# Patient Record
Sex: MALE | Race: WHITE | Employment: FULL TIME | URBAN - METROPOLITAN AREA
[De-identification: names, ages, dates, MRNs, and addresses within clinical notes are randomized per-mention and may not be internally consistent; named-entity substitution may affect disease eponyms.]

---

## 2019-01-23 ENCOUNTER — TELEPHONE (OUTPATIENT)
Dept: ORTHOPEDIC SURGERY | Age: 54
End: 2019-01-23

## 2019-05-24 ENCOUNTER — OFFICE VISIT (OUTPATIENT)
Dept: FAMILY MEDICINE CLINIC | Age: 54
End: 2019-05-24
Payer: COMMERCIAL

## 2019-05-24 VITALS
SYSTOLIC BLOOD PRESSURE: 118 MMHG | DIASTOLIC BLOOD PRESSURE: 74 MMHG | WEIGHT: 150.2 LBS | HEIGHT: 69 IN | BODY MASS INDEX: 22.25 KG/M2 | TEMPERATURE: 98.5 F | HEART RATE: 48 BPM

## 2019-05-24 DIAGNOSIS — R11.2 NAUSEA AND VOMITING, INTRACTABILITY OF VOMITING NOT SPECIFIED, UNSPECIFIED VOMITING TYPE: ICD-10-CM

## 2019-05-24 DIAGNOSIS — K59.1 FUNCTIONAL DIARRHEA: Primary | ICD-10-CM

## 2019-05-24 PROBLEM — R11.10 VOMITING: Status: ACTIVE | Noted: 2019-05-24

## 2019-05-24 PROCEDURE — 99213 OFFICE O/P EST LOW 20 MIN: CPT | Performed by: FAMILY MEDICINE

## 2019-05-24 RX ORDER — DOCUSATE SODIUM 100 MG/1
100 CAPSULE, LIQUID FILLED ORAL 2 TIMES DAILY
COMMUNITY
End: 2021-11-22 | Stop reason: ALTCHOICE

## 2019-05-24 RX ORDER — ONDANSETRON 4 MG/1
4 TABLET, FILM COATED ORAL 3 TIMES DAILY PRN
Qty: 10 TABLET | Refills: 2 | Status: SHIPPED | OUTPATIENT
Start: 2019-05-24 | End: 2019-07-01

## 2019-05-24 RX ORDER — PROMETHAZINE HYDROCHLORIDE 25 MG/1
25 SUPPOSITORY RECTAL
COMMUNITY
End: 2020-05-06

## 2019-05-24 ASSESSMENT — ENCOUNTER SYMPTOMS
SHORTNESS OF BREATH: 0
NAUSEA: 1
ABDOMINAL PAIN: 0
RHINORRHEA: 0
COUGH: 0
SORE THROAT: 0
CONSTIPATION: 0
SINUS PRESSURE: 0
VOMITING: 1
DIARRHEA: 1
CHEST TIGHTNESS: 0

## 2019-05-24 NOTE — PROGRESS NOTES
PATIENT HERE FOR SOV. REPORTS THAT HE STARTED HAVING VOMITTING AND DIARRHEA YESTERDAY. HAS STOPPED VOMITTING, BUT STILL HAS DIARRHEA. WAS ABLE TO KEEP DOWN SPRITE LAST NIGHT. NEEDS A RETURN TO WORK FOR TOMORROW.

## 2019-05-24 NOTE — PROGRESS NOTES
5/24/2019    Tyler Pappas    Chief Complaint   Patient presents with    Emesis    Diarrhea    Other     RETURN TO WORK NEEDED       HPI  History was obtained from patient. Dayday Varela is a 47 y.o. male who presents today needing a return to work note. Complains of having nausea, vomiting and diarrhea yesterday. The nausea & vomiting  have subsided, still with some diarrhea today. Says he feels 75% better. He hasn't taken any medications for the symptoms. He has no other complaints, needs a return to work note. Patient doubt food poisoning. He notes that most family members have this lasts for a day or 2. He is already improving but missed work yesterday. Patient has a history of cyclic and intractable vomiting so we need to be very careful when he gets GI symptoms. Patient notes that Phenergan decreases his mental state significantly. He would like to try Zofran. REVIEW OF SYMPTOMS    Review of Systems   Constitutional: Negative for chills and fever. HENT: Negative for rhinorrhea, sinus pressure and sore throat. Respiratory: Negative for cough, chest tightness and shortness of breath. Gastrointestinal: Positive for diarrhea, nausea and vomiting. Negative for abdominal pain and constipation. Genitourinary: Negative for frequency. Musculoskeletal: Negative for myalgias. PAST MEDICAL HISTORY  No past medical history on file.     FAMILY HISTORY  Family History   Problem Relation Age of Onset    Parkinsonism Mother     Diabetes Father     Hypertension Father     Colon Cancer Maternal Grandfather     Colon Cancer Paternal Grandfather     Diabetes Other     Hypertension Other     Cancer Other         bladder       SOCIAL HISTORY  Social History     Socioeconomic History    Marital status:      Spouse name: None    Number of children: None    Years of education: None    Highest education level: None   Occupational History    None   Social Needs    Financial resource strain: None    Food insecurity:     Worry: None     Inability: None    Transportation needs:     Medical: None     Non-medical: None   Tobacco Use    Smoking status: Former Smoker     Packs/day: 1.00     Years: 10.00     Pack years: 10.00     Last attempt to quit: 5/24/2004     Years since quitting: 15.0    Smokeless tobacco: Never Used   Substance and Sexual Activity    Alcohol use: Yes     Alcohol/week: 0.6 oz     Types: 1 Cans of beer per week    Drug use: No    Sexual activity: Yes     Partners: Female   Lifestyle    Physical activity:     Days per week: None     Minutes per session: None    Stress: None   Relationships    Social connections:     Talks on phone: None     Gets together: None     Attends Holiness service: None     Active member of club or organization: None     Attends meetings of clubs or organizations: None     Relationship status: None    Intimate partner violence:     Fear of current or ex partner: None     Emotionally abused: None     Physically abused: None     Forced sexual activity: None   Other Topics Concern    None   Social History Narrative    None        SURGICAL HISTORY  Past Surgical History:   Procedure Laterality Date    COLONOSCOPY  07/10/2017    diverticulosis, 2mm polyp, hem- Dr Deluca Bulgarian recomm 5 yr repeat    LITHOTRIPSY      TONSILLECTOMY AND ADENOIDECTOMY         CURRENT MEDICATIONS  Current Outpatient Medications   Medication Sig Dispense Refill    docusate sodium (COLACE) 100 MG capsule Take 100 mg by mouth 2 times daily      promethazine (PHENERGAN) 25 MG suppository Place 25 mg rectally every 48 hours as needed      citalopram (CELEXA) 20 MG tablet Take 20 mg by mouth daily. No current facility-administered medications for this visit.         ALLERGIES  Allergies   Allergen Reactions    Vicodin [Hydrocodone-Acetaminophen]        PHYSICAL EXAM    /74 (Site: Left Upper Arm)   Pulse (!) 48   Temp 98.5 °F (36.9 °C)   Ht 5' 9\" (1.753 m)   Wt 150 lb 3.2 oz (68.1 kg)   BMI 22.18 kg/m²     Physical Exam   Constitutional: He is oriented to person, place, and time. He appears well-developed. HENT:   Head: Normocephalic. Right Ear: External ear normal.   Left Ear: External ear normal.   Eyes: Conjunctivae and EOM are normal.   Neck: Neck supple. Cardiovascular: Normal rate, regular rhythm and normal heart sounds. Exam reveals no gallop and no friction rub. No murmur heard. Heart sounds regular & not fast.   Pulmonary/Chest: Effort normal and breath sounds normal. No stridor. No respiratory distress. He has no wheezes. He has no rales. He exhibits no tenderness. Lungs clear   Abdominal: Soft. Bowel sounds are normal. He exhibits no distension and no mass. There is no tenderness. There is no rebound and no guarding. No hernia. Musculoskeletal: Normal range of motion. Neurological: He is alert and oriented to person, place, and time. Skin: Skin is warm and dry. Nursing note and vitals reviewed. ASSESSMENT & PLAN    1. Functional diarrhea  Educated. Patient on probiotics. Cautioned him to not use Imodium as he's had problems with constipation. 2. Nausea and vomiting, intractability of vomiting not specified, unspecified vomiting type  - ondansetron (ZOFRAN) 4 MG tablet; Take 1 tablet by mouth 3 times daily as needed for Nausea or Vomiting  Dispense: 10 tablet; Refill: 2  Work note written to be off work yesterday and today return tomorrow without restriction      No follow-ups on file.           Electronically signed by Talat Zapata on 5/24/2019      Scribe Authentication Statement  I, Mimi Matthews, scribed portions of this documentation for and in the presence of Corey Jean MD on 5/24/19 at 9:32 AM.

## 2019-05-24 NOTE — LETTER
[address] [facility] [address] [id number]  Phone: [phone]  Fax: [phone]    Austin Courtney MD        May 24, 2019     Patient: Rosalie Stroud   YOB: 1965   Date of Visit: 5/24/2019       To Whom It May Concern: It is my medical opinion that Anthnoy Fletcher is to remain off work from 5/23/2019-5/24/2019 and    Return on 5/25/2019. If you have any questions or concerns, please don't hesitate to call.     Sincerely,        Austin Courtney MD

## 2019-06-01 ENCOUNTER — OFFICE VISIT (OUTPATIENT)
Dept: FAMILY MEDICINE CLINIC | Age: 54
End: 2019-06-01
Payer: COMMERCIAL

## 2019-06-01 ENCOUNTER — HOSPITAL ENCOUNTER (OUTPATIENT)
Age: 54
Discharge: HOME | End: 2019-06-01
Payer: COMMERCIAL

## 2019-06-01 VITALS
DIASTOLIC BLOOD PRESSURE: 60 MMHG | TEMPERATURE: 98.2 F | BODY MASS INDEX: 20.88 KG/M2 | OXYGEN SATURATION: 91 % | WEIGHT: 141 LBS | SYSTOLIC BLOOD PRESSURE: 100 MMHG | HEIGHT: 69 IN | HEART RATE: 72 BPM

## 2019-06-01 DIAGNOSIS — R11.2 NAUSEA AND VOMITING, INTRACTABILITY OF VOMITING NOT SPECIFIED, UNSPECIFIED VOMITING TYPE: Primary | ICD-10-CM

## 2019-06-01 LAB
ALBUMIN SERPL-MCNC: 4.7 GM/DL (ref 3.4–5)
ALP BLD-CCNC: 90 IU/L (ref 40–128)
ALT SERPL-CCNC: 17 U/L (ref 10–40)
ANION GAP SERPL CALCULATED.3IONS-SCNC: 19 MMOL/L (ref 4–16)
AST SERPL-CCNC: 14 IU/L (ref 15–37)
BASOPHILS ABSOLUTE: 0.1 K/CU MM
BASOPHILS RELATIVE PERCENT: 0.9 % (ref 0–1)
BILIRUB SERPL-MCNC: 0.9 MG/DL (ref 0–1)
BUN BLDV-MCNC: 27 MG/DL (ref 6–23)
CALCIUM SERPL-MCNC: 9.8 MG/DL (ref 8.3–10.6)
CHLORIDE BLD-SCNC: 93 MMOL/L (ref 99–110)
CO2: 22 MMOL/L (ref 21–32)
CREAT SERPL-MCNC: 1.1 MG/DL (ref 0.9–1.3)
DIFFERENTIAL TYPE: ABNORMAL
EOSINOPHILS ABSOLUTE: 0.2 K/CU MM
EOSINOPHILS RELATIVE PERCENT: 2.7 % (ref 0–3)
GFR AFRICAN AMERICAN: >60 ML/MIN/1.73M2
GFR NON-AFRICAN AMERICAN: >60 ML/MIN/1.73M2
GLUCOSE BLD-MCNC: 98 MG/DL (ref 70–99)
HCT VFR BLD CALC: 50.4 % (ref 42–52)
HEMOGLOBIN: 16.6 GM/DL (ref 13.5–18)
IMMATURE NEUTROPHIL %: 0.9 % (ref 0–0.43)
LYMPHOCYTES ABSOLUTE: 3 K/CU MM
LYMPHOCYTES RELATIVE PERCENT: 36.7 % (ref 24–44)
MCH RBC QN AUTO: 31.2 PG (ref 27–31)
MCHC RBC AUTO-ENTMCNC: 32.9 % (ref 32–36)
MCV RBC AUTO: 94.7 FL (ref 78–100)
MONOCYTES ABSOLUTE: 0.9 K/CU MM
MONOCYTES RELATIVE PERCENT: 11 % (ref 0–4)
NUCLEATED RBC %: 0 %
PDW BLD-RTO: 12.1 % (ref 11.7–14.9)
PLATELET # BLD: 538 K/CU MM (ref 140–440)
PMV BLD AUTO: 9.8 FL (ref 7.5–11.1)
POTASSIUM SERPL-SCNC: 4.7 MMOL/L (ref 3.5–5.1)
RBC # BLD: 5.32 M/CU MM (ref 4.6–6.2)
SEGMENTED NEUTROPHILS ABSOLUTE COUNT: 3.9 K/CU MM
SEGMENTED NEUTROPHILS RELATIVE PERCENT: 47.8 % (ref 36–66)
SODIUM BLD-SCNC: 134 MMOL/L (ref 135–145)
TOTAL IMMATURE NEUTOROPHIL: 0.07 K/CU MM
TOTAL NUCLEATED RBC: 0 K/CU MM
TOTAL PROTEIN: 7.3 GM/DL (ref 6.4–8.2)
WBC # BLD: 8.1 K/CU MM (ref 4–10.5)

## 2019-06-01 PROCEDURE — 36415 COLL VENOUS BLD VENIPUNCTURE: CPT

## 2019-06-01 PROCEDURE — 80053 COMPREHEN METABOLIC PANEL: CPT

## 2019-06-01 PROCEDURE — 99213 OFFICE O/P EST LOW 20 MIN: CPT | Performed by: NURSE PRACTITIONER

## 2019-06-01 PROCEDURE — 85025 COMPLETE CBC W/AUTO DIFF WBC: CPT

## 2019-06-01 ASSESSMENT — ENCOUNTER SYMPTOMS
NAUSEA: 1
SWOLLEN GLANDS: 0
WHEEZING: 0
VISUAL CHANGE: 0
CHEST TIGHTNESS: 0
COUGH: 0
SHORTNESS OF BREATH: 0
VOMITING: 1
CHANGE IN BOWEL HABIT: 1
ABDOMINAL PAIN: 0
SORE THROAT: 0
DIARRHEA: 0

## 2019-06-01 NOTE — PROGRESS NOTES
Simeon Flores   47 y.o.  male  [id number]      Chief Complaint   Patient presents with    Emesis    Weight Loss    Other     urinated once a day in 3 days    Constipation     1 bowel mobement  since x 8 days         Subjective:  53 y.o.male is here for a follow up. He has the following chronic/acute medical problems:  Patient Active Problem List   Diagnosis    Vomiting       Patient is here with complaints of nausea and vomiting. Patient states he has had this come and go for years. Nausea & Vomiting   This is a new problem. The current episode started in the past 7 days (Tuesday). Episode frequency: last time vomited on Thursday. The problem has been unchanged. Associated symptoms include anorexia, a change in bowel habit, chills, fatigue, nausea, vomiting and weakness. Pertinent negatives include no abdominal pain, arthralgias, chest pain, congestion, coughing, diaphoresis, fever, headaches, joint swelling, myalgias, neck pain, numbness, rash, sore throat, swollen glands, urinary symptoms, vertigo or visual change. Nothing aggravates the symptoms. Treatments tried: Phenergan. The treatment provided mild relief. Review of Systems   Constitutional: Positive for appetite change (decreased), chills and fatigue. Negative for diaphoresis and fever. HENT: Negative. Negative for congestion and sore throat. Respiratory: Negative for cough, chest tightness, shortness of breath and wheezing. Cardiovascular: Negative for chest pain and palpitations. Gastrointestinal: Positive for anorexia, change in bowel habit, nausea and vomiting. Negative for abdominal pain and diarrhea. Genitourinary: Negative for difficulty urinating. Musculoskeletal: Negative for arthralgias, joint swelling, myalgias and neck pain. Skin: Negative for rash. Neurological: Positive for weakness and light-headedness. Negative for vertigo, numbness and headaches.        Current Outpatient Medications   Medication Sig Dispense Refill    promethazine (PHENERGAN) 25 MG suppository Place 25 mg rectally every 48 hours as needed      docusate sodium (COLACE) 100 MG capsule Take 100 mg by mouth 2 times daily      ondansetron (ZOFRAN) 4 MG tablet Take 1 tablet by mouth 3 times daily as needed for Nausea or Vomiting 10 tablet 2    citalopram (CELEXA) 20 MG tablet Take 20 mg by mouth daily. No current facility-administered medications for this visit. Past medical, family,surgical history reviewed today. Objective:  /60   Pulse 72   Temp 98.2 °F (36.8 °C)   Ht 5' 9\" (1.753 m)   Wt 141 lb (64 kg)   SpO2 91%   BMI 20.82 kg/m²   BP Readings from Last 3 Encounters:   06/01/19 100/60   05/24/19 118/74     Wt Readings from Last 3 Encounters:   06/01/19 141 lb (64 kg)   05/24/19 150 lb 3.2 oz (68.1 kg)         Physical Exam   Constitutional: He is oriented to person, place, and time. He appears well-developed and well-nourished. HENT:   Head: Normocephalic. Right Ear: Tympanic membrane, external ear and ear canal normal.   Left Ear: Tympanic membrane, external ear and ear canal normal.   Neck: Neck supple. Cardiovascular: Normal rate, regular rhythm and normal heart sounds. Pulmonary/Chest: Effort normal and breath sounds normal.   Abdominal: Soft. Bowel sounds are normal.   Neurological: He is alert and oriented to person, place, and time. Skin: Skin is warm and dry. Psychiatric: He has a normal mood and affect.  His behavior is normal.       No results found for: WBC, HGB, HCT, MCV, PLT  No results found for: NA, K, CL, CO2, BUN, CREATININE, GLUCOSE, CALCIUM, PROT, LABALBU, BILITOT, ALKPHOS, AST, ALT, LABGLOM, GFRAA, AGRATIO, GLOB  No results found for: CHOL  No results found for: TRIG  No results found for: HDL  No results found for: LDLCALC, LDLCHOLESTEROL  No results found for: LABA1C  No results found for: TSHFT4, TSH, TSHHS      ASSESSMENT/PLAN:      1. Nausea and vomiting, intractability of vomiting not specified, unspecified vomiting type  - Recommended rehydration w/gatorade and or pedialyte. Discussed s/s dehydration and when to seek further medical attention  - Encouraged small, frequent bland meals. Discussed possible benefits of BRAT diet. - Discussed advantages and disadvantages of antiemetic and antimotility agents. Encouraged pt to use with caution. Pt verbalizes understanding.   - CBC WITH AUTO DIFFERENTIAL  - COMPREHENSIVE METABOLIC PANEL        No orders of the defined types were placed in this encounter. There are no discontinued medications. Care discussed with patient. Questions answered. Patient verbalizes understanding and agrees with plan. After visit summary provided. Advised to call for any problems, questions, or concerns. Return if symptoms worsen or fail to improve.                                              Signed:  RADHA Machado CNP  06/01/19  9:56 AM

## 2019-07-01 ENCOUNTER — OFFICE VISIT (OUTPATIENT)
Dept: FAMILY MEDICINE CLINIC | Age: 54
End: 2019-07-01
Payer: COMMERCIAL

## 2019-07-01 VITALS
HEIGHT: 69 IN | BODY MASS INDEX: 23.25 KG/M2 | DIASTOLIC BLOOD PRESSURE: 60 MMHG | HEART RATE: 72 BPM | WEIGHT: 157 LBS | SYSTOLIC BLOOD PRESSURE: 102 MMHG

## 2019-07-01 DIAGNOSIS — L73.9 FOLLICULITIS: ICD-10-CM

## 2019-07-01 DIAGNOSIS — F41.1 GENERALIZED ANXIETY DISORDER: Primary | ICD-10-CM

## 2019-07-01 PROCEDURE — 99213 OFFICE O/P EST LOW 20 MIN: CPT | Performed by: FAMILY MEDICINE

## 2019-07-01 RX ORDER — CITALOPRAM 20 MG/1
20 TABLET ORAL DAILY
Qty: 90 TABLET | Refills: 1 | Status: SHIPPED | OUTPATIENT
Start: 2019-07-01 | End: 2020-01-08 | Stop reason: SDUPTHER

## 2019-07-01 RX ORDER — CEPHALEXIN 500 MG/1
500 CAPSULE ORAL 2 TIMES DAILY
Qty: 42 CAPSULE | Refills: 0 | Status: SHIPPED | OUTPATIENT
Start: 2019-07-01 | End: 2020-01-08

## 2019-07-01 ASSESSMENT — ENCOUNTER SYMPTOMS
SHORTNESS OF BREATH: 0
RHINORRHEA: 0
SORE THROAT: 0
SINUS PRESSURE: 0
CHEST TIGHTNESS: 0

## 2019-07-01 ASSESSMENT — PATIENT HEALTH QUESTIONNAIRE - PHQ9
1. LITTLE INTEREST OR PLEASURE IN DOING THINGS: 0
SUM OF ALL RESPONSES TO PHQ QUESTIONS 1-9: 0
2. FEELING DOWN, DEPRESSED OR HOPELESS: 0
SUM OF ALL RESPONSES TO PHQ9 QUESTIONS 1 & 2: 0
SUM OF ALL RESPONSES TO PHQ QUESTIONS 1-9: 0

## 2019-07-01 NOTE — PROGRESS NOTES
7/1/2019    Michelle Bradshaw    Chief Complaint   Patient presents with    6 Month Follow-Up     - no c/o       HPI  Michelet Peacock is a 47 y.o. male who presents today with up on his generalized anxiety. Both he and wife agree that he is better on medication he has tried off of it this year without success. They noted no significant side effects to the medication and when to remain on the same. Patient has been doing more stone cutting than usual he has multiple cuts on his forearms almost a follicular appearance. Patient questions whether he could be allergic to the stone that he cuts. Denies nocturia. He has no urinary symptoms. He has no family history of prostate cancer and persons less than [de-identified]years old. REVIEW OF SYMPTOMS  Review of Systems   Constitutional: Negative for chills and fever. HENT: Negative for ear pain, rhinorrhea, sinus pressure and sore throat. Respiratory: Negative for chest tightness and shortness of breath. Musculoskeletal: Negative for myalgias. PAST MEDICAL HISTORY  No past medical history on file.     FAMILY HISTORY  Family History   Problem Relation Age of Onset    Parkinsonism Mother     Diabetes Father     Hypertension Father     Colon Cancer Maternal Grandfather     Colon Cancer Paternal Grandfather     Diabetes Other     Hypertension Other     Cancer Other         bladder       SOCIAL HISTORY  Social History     Socioeconomic History    Marital status:      Spouse name: None    Number of children: None    Years of education: None    Highest education level: None   Occupational History    None   Social Needs    Financial resource strain: None    Food insecurity:     Worry: None     Inability: None    Transportation needs:     Medical: None     Non-medical: None   Tobacco Use    Smoking status: Former Smoker     Packs/day: 1.00     Years: 19.00     Pack years: 19.00     Start date: 5     Last attempt to quit: 5/24/2004     Years since

## 2020-01-08 ENCOUNTER — OFFICE VISIT (OUTPATIENT)
Dept: FAMILY MEDICINE CLINIC | Age: 55
End: 2020-01-08
Payer: COMMERCIAL

## 2020-01-08 VITALS
BODY MASS INDEX: 23.67 KG/M2 | SYSTOLIC BLOOD PRESSURE: 120 MMHG | WEIGHT: 159.8 LBS | HEART RATE: 56 BPM | HEIGHT: 69 IN | DIASTOLIC BLOOD PRESSURE: 78 MMHG

## 2020-01-08 PROBLEM — M54.41 ACUTE MIDLINE LOW BACK PAIN WITH RIGHT-SIDED SCIATICA: Status: ACTIVE | Noted: 2020-01-08

## 2020-01-08 PROCEDURE — 99213 OFFICE O/P EST LOW 20 MIN: CPT | Performed by: FAMILY MEDICINE

## 2020-01-08 RX ORDER — PREDNISONE 10 MG/1
TABLET ORAL
Qty: 23 TABLET | Refills: 0 | Status: SHIPPED | OUTPATIENT
Start: 2020-01-08 | End: 2020-05-06

## 2020-01-08 RX ORDER — CITALOPRAM 20 MG/1
20 TABLET ORAL DAILY
Qty: 90 TABLET | Refills: 1 | Status: SHIPPED | OUTPATIENT
Start: 2020-01-08 | End: 2020-05-06 | Stop reason: SDUPTHER

## 2020-01-08 ASSESSMENT — PATIENT HEALTH QUESTIONNAIRE - PHQ9
1. LITTLE INTEREST OR PLEASURE IN DOING THINGS: 0
2. FEELING DOWN, DEPRESSED OR HOPELESS: 0
SUM OF ALL RESPONSES TO PHQ9 QUESTIONS 1 & 2: 0
SUM OF ALL RESPONSES TO PHQ QUESTIONS 1-9: 0
SUM OF ALL RESPONSES TO PHQ QUESTIONS 1-9: 0

## 2020-01-08 ASSESSMENT — ENCOUNTER SYMPTOMS
ABDOMINAL PAIN: 0
SHORTNESS OF BREATH: 0
RHINORRHEA: 0
CHEST TIGHTNESS: 0
CONSTIPATION: 0
COUGH: 0
SINUS PRESSURE: 0
SORE THROAT: 0
DIARRHEA: 0

## 2020-01-09 NOTE — PROGRESS NOTES
insecurity:     Worry: Not on file     Inability: Not on file    Transportation needs:     Medical: Not on file     Non-medical: Not on file   Tobacco Use    Smoking status: Former Smoker     Packs/day: 1.00     Years: 19.00     Pack years: 19.00     Start date: 5     Last attempt to quit: 5/24/2004     Years since quitting: 15.6    Smokeless tobacco: Never Used   Substance and Sexual Activity    Alcohol use:  Yes     Alcohol/week: 1.0 standard drinks     Types: 1 Cans of beer per week    Drug use: No    Sexual activity: Yes     Partners: Female   Lifestyle    Physical activity:     Days per week: Not on file     Minutes per session: Not on file    Stress: Not on file   Relationships    Social connections:     Talks on phone: Not on file     Gets together: Not on file     Attends Rastafari service: Not on file     Active member of club or organization: Not on file     Attends meetings of clubs or organizations: Not on file     Relationship status: Not on file    Intimate partner violence:     Fear of current or ex partner: Not on file     Emotionally abused: Not on file     Physically abused: Not on file     Forced sexual activity: Not on file   Other Topics Concern    Not on file   Social History Narrative    Not on file        SURGICAL HISTORY  Past Surgical History:   Procedure Laterality Date    COLONOSCOPY  07/10/2017    diverticulosis, 2mm polyp, hem- Dr José Luis Russ recomm 5 yr repeat    LITHOTRIPSY      TONSILLECTOMY AND ADENOIDECTOMY         CURRENT MEDICATIONS  Current Outpatient Medications   Medication Sig Dispense Refill    citalopram (CELEXA) 20 MG tablet Take 1 tablet by mouth daily 90 tablet 1    predniSONE (DELTASONE) 10 MG tablet 3 pills for 4 days, then 2 pills for 4 d, then 1 pill for 2d, then 1/2 pill for 2d 23 tablet 0    docusate sodium (COLACE) 100 MG capsule Take 100 mg by mouth 2 times daily      promethazine (PHENERGAN) 25 MG suppository Place 25 mg rectally every 48 hours as

## 2020-05-06 ENCOUNTER — OFFICE VISIT (OUTPATIENT)
Dept: FAMILY MEDICINE CLINIC | Age: 55
End: 2020-05-06
Payer: COMMERCIAL

## 2020-05-06 VITALS
HEART RATE: 54 BPM | DIASTOLIC BLOOD PRESSURE: 84 MMHG | BODY MASS INDEX: 22.58 KG/M2 | WEIGHT: 152.9 LBS | TEMPERATURE: 97.7 F | SYSTOLIC BLOOD PRESSURE: 120 MMHG

## 2020-05-06 PROCEDURE — 99213 OFFICE O/P EST LOW 20 MIN: CPT | Performed by: FAMILY MEDICINE

## 2020-05-06 RX ORDER — CITALOPRAM 20 MG/1
20 TABLET ORAL DAILY
Qty: 90 TABLET | Refills: 1 | Status: SHIPPED | OUTPATIENT
Start: 2020-05-06 | End: 2021-03-19 | Stop reason: SDUPTHER

## 2020-07-08 ENCOUNTER — OFFICE VISIT (OUTPATIENT)
Dept: FAMILY MEDICINE CLINIC | Age: 55
End: 2020-07-08
Payer: COMMERCIAL

## 2020-07-08 VITALS
SYSTOLIC BLOOD PRESSURE: 120 MMHG | WEIGHT: 157.6 LBS | HEIGHT: 68 IN | DIASTOLIC BLOOD PRESSURE: 82 MMHG | BODY MASS INDEX: 23.89 KG/M2 | HEART RATE: 64 BPM

## 2020-07-08 PROCEDURE — 99214 OFFICE O/P EST MOD 30 MIN: CPT | Performed by: FAMILY MEDICINE

## 2020-07-08 RX ORDER — CELECOXIB 200 MG/1
200 CAPSULE ORAL DAILY PRN
Qty: 30 CAPSULE | Refills: 5 | Status: SHIPPED | OUTPATIENT
Start: 2020-07-08 | End: 2021-07-23 | Stop reason: SDUPTHER

## 2020-07-08 NOTE — PROGRESS NOTES
7/8/2020    [facility]    Chief Complaint   Patient presents with    6 Month Follow-Up    Other     sore inner corner left eye    Hip Pain     continuing right hip pain       HPI    Jennifer Esparza is a 49 y.o. male who presents today with follow-up. Kalina de la cruz had a large skin tag near his left intercanthal region. Must of gotten pulled on it because it is now black and avascular. He will have a mild stock in the area but there is no sign of infection there is no surrounding erythema. His eye looks fine. He notes his vision is fine. The skin tag was detention coming off but it appeared to start bleeding as I encouraged it so I needed to leave it remain where he was. Patient notes right hip pain this appears to be an exacerbation of sciatica that he was treated for 6 months ago. It appears mild. He has negative straight leg raise today. Patient is only been able to do intermittent NSAIDs. REVIEW OF SYSTEMS    Constitutional:  Denies fever, chills, weight loss or weakness  Eyes:  no photophobia or discharge  ENT:  no sore throat or ear pain  Cardiovascular:  Denies chest pain, palpitations or swelling  Respiratory:  Denies cough or shortness of breath  GI:  no abdominal pain, nausea, vomiting, or diarrhea  Musculoskeletal:  no back pain  Skin:  No rashes  Neurologic:  no headache, focal weakness, or sensory changes  Endocrine:  no polyuria or polydipsia      PAST MEDICAL HISTORY  No past medical history on file.     FAMILY HISTORY  Family History   Problem Relation Age of Onset    Parkinsonism Mother     Diabetes Father     Hypertension Father     Colon Cancer Maternal Grandfather     Colon Cancer Paternal Grandfather     Diabetes Other     Hypertension Other     Cancer Other         bladder       SOCIAL HISTORY  Social History     Socioeconomic History    Marital status:      Spouse name: Not on file    Number of children: Not on file    Years of education: Not on file    Highest education level: Not on file   Occupational History    Not on file   Social Needs    Financial resource strain: Not on file    Food insecurity     Worry: Not on file     Inability: Not on file    Transportation needs     Medical: Not on file     Non-medical: Not on file   Tobacco Use    Smoking status: Former Smoker     Packs/day: 1.00     Years: 19.00     Pack years: 19.00     Start date:  [facility]     Last attempt to quit: 2004     Years since quittin.1    Smokeless tobacco: Never Used   Substance and Sexual Activity    Alcohol use:  Yes     Alcohol/week: 1.0 standard drinks     Types: 1 Cans of beer per week    Drug use: No    Sexual activity: Yes     Partners: Female   Lifestyle    Physical activity     Days per week: Not on file     Minutes per session: Not on file    Stress: Not on file   Relationships    Social connections     Talks on phone: Not on file     Gets together: Not on file     Attends Advent service: Not on file     Active member of club or organization: Not on file     Attends meetings of clubs or organizations: Not on file     Relationship status: Not on file    Intimate partner violence     Fear of current or ex partner: Not on file     Emotionally abused: Not on file     Physically abused: Not on file     Forced sexual activity: Not on file   Other Topics Concern    Not on file   Social History Narrative    Not on file        SURGICAL HISTORY  Past Surgical History:   Procedure Laterality Date    COLONOSCOPY  07/10/2017    diverticulosis, 2mm polyp, hem- Dr Miguelina Estevez recomm 5 yr repeat    LITHOTRIPSY      TONSILLECTOMY AND ADENOIDECTOMY         CURRENT MEDICATIONS  Current Outpatient Medications   Medication Sig Dispense Refill    celecoxib (CELEBREX) 200 MG capsule Take 1 capsule by mouth daily as needed for Pain 30 capsule 5    citalopram (CELEXA) 20 MG tablet Take 1 tablet by mouth daily 90 tablet 1    docusate sodium (COLACE) 100 MG capsule Take 100 mg by mouth 2 times daily       No current facility-administered medications for this visit. ALLERGIES  Allergies   Allergen Reactions    Vicodin [Hydrocodone-Acetaminophen]        PHYSICAL EXAM    /82   Pulse 64   Ht 5' 8\" (1.727 m)   Wt 157 lb 9.6 oz (71.5 kg)   BMI 23.96 kg/m²     Constitutional:  Well developed, well nourished  HEENT:  Normocephalic, atraumatic, bilateral external ears normal, oropharynx moist, nose normal  Neck:  Normal range of motion, no tenderness, supple  Lymphatic:  No lymphadenopathy noted  Cardiovascular:  Normal heart rate, S1S2 nl  Thorax & Lungs:  Normal breath sounds, no respiratory distress, no wheezing  Skin:  Warm, dry, no erythema, no rash  Back:  straight  Extremities:  No edema, no tenderness, no cyanosis  Musculoskeletal:  Good range of motion   Neurologic:  Alert & oriented X 3      ASSESSMENT & PLAN    1. Acute midline low back pain with right-sided sciatica  Stop the NSAID start below  - celecoxib (CELEBREX) 200 MG capsule; Take 1 capsule by mouth daily as needed for Pain  Dispense: 30 capsule; Refill: 5    2. Skin tag  Currently devascularize and no sign of infection. Let it fall off on its own. Patient follow-up if giving him problems. 3.  Generalized anxiety. Issue controlled. Continue meds. Refilled meds.        Electronically signed by Omar Johnson MD on 7/8/2020

## 2021-03-19 RX ORDER — CITALOPRAM 20 MG/1
20 TABLET ORAL DAILY
Qty: 14 TABLET | Refills: 0 | Status: SHIPPED | OUTPATIENT
Start: 2021-03-19 | End: 2021-04-23 | Stop reason: SDUPTHER

## 2021-03-19 NOTE — TELEPHONE ENCOUNTER
Requested Prescriptions     Signed Prescriptions Disp Refills    citalopram (CELEXA) 20 MG tablet 14 tablet 0     Sig: Take 1 tablet by mouth daily for 14 days . NEED AN APPOINMENT     Authorizing Provider: Marcia Rivera     Ordering User: Ulices Anthony

## 2021-04-26 RX ORDER — CITALOPRAM 20 MG/1
20 TABLET ORAL DAILY
Qty: 30 TABLET | Refills: 0 | Status: SHIPPED | OUTPATIENT
Start: 2021-04-26 | End: 2021-07-23 | Stop reason: SDUPTHER

## 2021-07-23 DIAGNOSIS — M54.41 ACUTE MIDLINE LOW BACK PAIN WITH RIGHT-SIDED SCIATICA: ICD-10-CM

## 2021-07-23 RX ORDER — CITALOPRAM 20 MG/1
20 TABLET ORAL DAILY
Qty: 30 TABLET | Refills: 0 | Status: SHIPPED | OUTPATIENT
Start: 2021-07-23 | End: 2021-11-22 | Stop reason: SDUPTHER

## 2021-07-23 RX ORDER — CELECOXIB 200 MG/1
200 CAPSULE ORAL DAILY PRN
Qty: 60 CAPSULE | Refills: 2 | Status: SHIPPED | OUTPATIENT
Start: 2021-07-23 | End: 2021-11-22 | Stop reason: SDUPTHER

## 2021-11-22 ENCOUNTER — OFFICE VISIT (OUTPATIENT)
Dept: FAMILY MEDICINE CLINIC | Age: 56
End: 2021-11-22
Payer: COMMERCIAL

## 2021-11-22 VITALS
DIASTOLIC BLOOD PRESSURE: 72 MMHG | BODY MASS INDEX: 25.4 KG/M2 | HEIGHT: 68 IN | OXYGEN SATURATION: 98 % | SYSTOLIC BLOOD PRESSURE: 120 MMHG | HEART RATE: 52 BPM | WEIGHT: 167.6 LBS

## 2021-11-22 DIAGNOSIS — Z13.220 LIPID SCREENING: ICD-10-CM

## 2021-11-22 DIAGNOSIS — G47.9 SLEEP DISORDER: Primary | ICD-10-CM

## 2021-11-22 DIAGNOSIS — M54.41 ACUTE MIDLINE LOW BACK PAIN WITH RIGHT-SIDED SCIATICA: ICD-10-CM

## 2021-11-22 DIAGNOSIS — F41.9 ANXIETY: ICD-10-CM

## 2021-11-22 PROCEDURE — 99214 OFFICE O/P EST MOD 30 MIN: CPT | Performed by: STUDENT IN AN ORGANIZED HEALTH CARE EDUCATION/TRAINING PROGRAM

## 2021-11-22 RX ORDER — CELECOXIB 200 MG/1
200 CAPSULE ORAL DAILY PRN
Qty: 90 CAPSULE | Refills: 1 | Status: SHIPPED | OUTPATIENT
Start: 2021-11-22 | End: 2022-05-21

## 2021-11-22 RX ORDER — CITALOPRAM 20 MG/1
20 TABLET ORAL DAILY
Qty: 90 TABLET | Refills: 1 | Status: SHIPPED | OUTPATIENT
Start: 2021-11-22 | End: 2022-01-12

## 2021-11-22 RX ORDER — TRAZODONE HYDROCHLORIDE 50 MG/1
50 TABLET ORAL NIGHTLY
Qty: 30 TABLET | Refills: 1 | Status: SHIPPED
Start: 2021-11-22 | End: 2022-01-12 | Stop reason: SINTOL

## 2021-11-22 ASSESSMENT — PATIENT HEALTH QUESTIONNAIRE - PHQ9
2. FEELING DOWN, DEPRESSED OR HOPELESS: 0
SUM OF ALL RESPONSES TO PHQ QUESTIONS 1-9: 0
SUM OF ALL RESPONSES TO PHQ QUESTIONS 1-9: 0
SUM OF ALL RESPONSES TO PHQ9 QUESTIONS 1 & 2: 0
SUM OF ALL RESPONSES TO PHQ QUESTIONS 1-9: 0
1. LITTLE INTEREST OR PLEASURE IN DOING THINGS: 0

## 2021-11-22 NOTE — LETTER
[address] [place] [address] Haily Carrasquillo  Phone: [phone]  Fax: [phone]    Rhea Thompson DO        November 22, 2021     Patient: Lin Hernandez   YOB: 1965   Date of Visit: 11/22/2021       To Whom It May Concern: It is my medical opinion that Keven Pradhan was examined in my office 11/22/2021. Please excuse him for this day. If you have any questions or concerns, please don't hesitate to call.     Sincerely,        Christopher Luna DO

## 2021-11-22 NOTE — PROGRESS NOTES
12/8/2021    [facility]    Chief Complaint   Patient presents with    Follow-up    Insomnia     x's 5 days . pt reports can fall asleep but not stay asleep       HPI  History was obtained from patient. Gary Horne is a 55 y.o. male with a PMHx decpression, osteoarthritis as listed below who presents today for follow up on chronic conditions. Patient having difficulty sleeping over the past 4-5 days. Patient has not been evaluated in over a year and off meds for 2 days per patient. Currently averaging 1,2 hours. Patient has racing thoughts at night. No issues falling asleep, issues staying asleep. Has not been on medications for this in the past.   Patient has tried melatonin, did not work. Denies snoring. Feels well rested after a good night of rest, no afternoon naps. Follows a fairly good sleep hygiene. Otherwise depression stable, back pain stable on NSAIDs PRN    Patient works [employer]    1. Sleep disorder    2. Acute midline low back pain with right-sided sciatica    3. Anxiety    4. Lipid screening             REVIEW OF SYMPTOMS    Review of Systems   Constitutional: Negative for chills and fatigue. HENT: Negative for congestion and sore throat. Respiratory: Negative for shortness of breath and wheezing. Cardiovascular: Negative for chest pain and palpitations. Gastrointestinal: Negative for abdominal pain and nausea. Genitourinary: Negative for frequency and urgency. Neurological: Negative for light-headedness. PAST MEDICAL HISTORY  History reviewed. No pertinent past medical history.     FAMILY HISTORY  Family History   Problem Relation Age of Onset   Pate Scientologist Parkinsonism Mother     Diabetes Father     Hypertension Father     Colon Cancer Maternal Grandfather     Colon Cancer Paternal Grandfather     Diabetes Other     Hypertension Other     Cancer Other         bladder       SOCIAL HISTORY  Social History     Socioeconomic History    file        SURGICAL HISTORY  Past Surgical History:   Procedure Laterality Date    COLONOSCOPY  07/10/2017    diverticulosis, 2mm polyp, hem- Dr Gray Drought recomm 5 yr repeat    LITHOTRIPSY      TONSILLECTOMY AND ADENOIDECTOMY                   CURRENT MEDICATIONS  Current Outpatient Medications   Medication Sig Dispense Refill    celecoxib (CELEBREX) 200 MG capsule Take 1 capsule by mouth daily as needed for Pain 90 capsule 1    citalopram (CELEXA) 20 MG tablet Take 1 tablet by mouth daily 90 tablet 1    traZODone (DESYREL) 50 MG tablet Take 1 tablet by mouth nightly 30 tablet 1     No current facility-administered medications for this visit. ALLERGIES  Allergies   Allergen Reactions    Vicodin [Hydrocodone-Acetaminophen]        PHYSICAL EXAM    /72   Pulse 52   Ht 5' 8\" (1.727 m)   Wt 167 lb 9.6 oz (76 kg)   SpO2 98%   BMI 25.48 kg/m²     Physical Exam  Constitutional:       Appearance: Normal appearance. HENT:      Head: Normocephalic and atraumatic. Eyes:      Extraocular Movements: Extraocular movements intact. Pupils: Pupils are equal, round, and reactive to light. Cardiovascular:      Rate and Rhythm: Normal rate and regular rhythm. Pulses: Normal pulses. Heart sounds: No murmur heard. No friction rub. No gallop. Skin:     General: Skin is warm and dry. Neurological:      General: No focal deficit present. Mental Status: He is alert. Psychiatric:         Mood and Affect: Mood normal.         Behavior: Behavior normal.         ASSESSMENT & PLAN    1. Acute midline low back pain with right-sided sciatica  Fair control on current regimen, f/u labs  - celecoxib (CELEBREX) 200 MG capsule; Take 1 capsule by mouth daily as needed for Pain  Dispense: 90 capsule; Refill: 1  - CBC WITH AUTO DIFFERENTIAL; Future  - Comprehensive Metabolic Panel; Future    2.  Sleep disorder  -will start trial of trazodone, adverse effects discussed patient understands and agrees with plan  - traZODone (DESYREL) 50 MG tablet; Take 1 tablet by mouth nightly  Dispense: 30 tablet; Refill: 1    3. Anxiety  -discussed to take in AM as can cause sleep difficulties  -Anxiety fair control  - citalopram (CELEXA) 20 MG tablet; Take 1 tablet by mouth daily  Dispense: 90 tablet; Refill: 1    4. Lipid screening  - Lipid, Fasting; Future      Return in about 2 months (around 1/22/2022), or needs appt next 6-8 weeks.          Electronically signed by Marco Antonio House DO on 12/8/2021

## 2021-12-08 ASSESSMENT — ENCOUNTER SYMPTOMS
SORE THROAT: 0
ABDOMINAL PAIN: 0
NAUSEA: 0
SHORTNESS OF BREATH: 0
WHEEZING: 0

## 2022-01-12 ENCOUNTER — OFFICE VISIT (OUTPATIENT)
Dept: FAMILY MEDICINE CLINIC | Age: 57
End: 2022-01-12
Payer: COMMERCIAL

## 2022-01-12 VITALS
SYSTOLIC BLOOD PRESSURE: 122 MMHG | HEART RATE: 72 BPM | RESPIRATION RATE: 16 BRPM | BODY MASS INDEX: 24.4 KG/M2 | DIASTOLIC BLOOD PRESSURE: 76 MMHG | WEIGHT: 161 LBS | HEIGHT: 68 IN | OXYGEN SATURATION: 98 %

## 2022-01-12 DIAGNOSIS — G47.9 SLEEP DISORDER: Primary | ICD-10-CM

## 2022-01-12 PROCEDURE — 99213 OFFICE O/P EST LOW 20 MIN: CPT | Performed by: STUDENT IN AN ORGANIZED HEALTH CARE EDUCATION/TRAINING PROGRAM

## 2022-01-12 RX ORDER — DOXEPIN HYDROCHLORIDE 3 MG/1
3 TABLET ORAL NIGHTLY PRN
Qty: 30 TABLET | Refills: 1 | Status: SHIPPED | OUTPATIENT
Start: 2022-01-12 | End: 2022-03-13

## 2022-01-12 SDOH — ECONOMIC STABILITY: FOOD INSECURITY: WITHIN THE PAST 12 MONTHS, THE FOOD YOU BOUGHT JUST DIDN'T LAST AND YOU DIDN'T HAVE MONEY TO GET MORE.: NEVER TRUE

## 2022-01-12 SDOH — ECONOMIC STABILITY: FOOD INSECURITY: WITHIN THE PAST 12 MONTHS, YOU WORRIED THAT YOUR FOOD WOULD RUN OUT BEFORE YOU GOT MONEY TO BUY MORE.: NEVER TRUE

## 2022-01-12 ASSESSMENT — SOCIAL DETERMINANTS OF HEALTH (SDOH): HOW HARD IS IT FOR YOU TO PAY FOR THE VERY BASICS LIKE FOOD, HOUSING, MEDICAL CARE, AND HEATING?: NOT HARD AT ALL

## 2022-01-12 NOTE — LETTER
[address] [place] [address] [place] Carrasquillo  Phone: [phone]  Fax: [phone]    Collin Tucker DO        January 12, 2022     Patient: Gregorio Hoffman   YOB: 1965   Date of Visit: 1/12/2022       To Whom it May Concern:    Mariela Matthews was seen in my clinic on 1/12/2022. He may return to work on 1/13/2022. If you have any questions or concerns, please don't hesitate to call.     Sincerely,         Christopher Luna, DO

## 2022-01-12 NOTE — PROGRESS NOTES
2022    [facility]    Chief Complaint   Patient presents with    Follow-up     Presenting for two month follow up visit, insomnia.  Other     Trazadone made dizzy. It was helping insomnia, but stopped due to dizziness. HPI  History was obtained from patient. Shanae Chapa is a 55 y.o. male with a PMHx as listed below who presents today for 2 month follow up. Patient tried trazodone did not work however now sleeping somewhat improved. Denies any snoring, choking sensation, averages roughly 8-10 when sleeping well however sometimes he can't stay asleep. Wife observes patient sleeping has not noticed these symptoms. 1. Sleep disorder             REVIEW OF SYMPTOMS    Review of Systems   Constitutional: Negative for chills and fatigue. HENT: Negative for congestion and sore throat. Respiratory: Negative for shortness of breath and wheezing. Cardiovascular: Negative for chest pain and palpitations. Gastrointestinal: Negative for abdominal pain and nausea. Genitourinary: Negative for frequency and urgency. Neurological: Negative for light-headedness. PAST MEDICAL HISTORY  History reviewed. No pertinent past medical history.     FAMILY HISTORY  Family History   Problem Relation Age of Onset    Parkinsonism Mother     Diabetes Father     Hypertension Father     Colon Cancer Maternal Grandfather     Colon Cancer Paternal Grandfather     Diabetes Other     Hypertension Other     Cancer Other         bladder       SOCIAL HISTORY  Social History     Socioeconomic History    Marital status:      Spouse name: None    Number of children: None    Years of education: None    Highest education level: None   Occupational History    None   Tobacco Use    Smoking status: Former Smoker     Packs/day: 1.00     Years: 19.00     Pack years: 19.00     Start date: 5     Quit date: 2004     Years since quittin.6    Smokeless tobacco: Never Used   Vaping Use    Vaping Use: Never used   Substance and Sexual Activity    Alcohol use: Yes     Alcohol/week: 1.0 standard drink     Types: 1 Cans of beer per week    Drug use: No    Sexual activity: Yes     Partners: Female   Other Topics Concern    None   Social History Narrative    None     Social Determinants of Health     Financial Resource Strain: Low Risk     Difficulty of Paying Living Expenses: Not hard at all   Food Insecurity: No Food Insecurity    Worried About Running Out of Food in the Last Year: Never true    [address] in the Last Year: Never true   Transportation Needs:     Lack of Transportation (Medical): Not on file    Lack of Transportation (Non-Medical):  Not on file   Physical Activity:     Days of Exercise per Week: Not on file    Minutes of Exercise per Session: Not on file   Stress:     Feeling of Stress : Not on file   Social Connections:     Frequency of Communication with Friends and Family: Not on file    Frequency of Social Gatherings with Friends and Family: Not on file    Attends [employer] Services: Not on file    Active Member of [address] [place] [address] or Organizations: Not on file    Attends Club or Organization Meetings: Not on file    Marital Status: Not on file   Intimate Partner Violence:     Fear of Current or Ex-Partner: Not on file    Emotionally Abused: Not on file    Physically Abused: Not on file    Sexually Abused: Not on file   Housing Stability:     Unable to Pay for Housing in the Last Year: Not on file    Number of [place] in the Last Year: Not on file    Unstable Housing in the Last Year: Not on file        SURGICAL HISTORY  Past Surgical History:   Procedure Laterality Date    COLONOSCOPY  07/10/2017    diverticulosis, 2mm polyp, hem- Dr Ji Bradley recomm 5 yr repeat    LITHOTRIPSY      TONSILLECTOMY AND ADENOIDECTOMY                   CURRENT MEDICATIONS  Current Outpatient Medications   Medication Sig Dispense Refill    doxepin (SILENOR) 3 MG TABS tablet Take 1 tablet by mouth nightly as needed (sleep) 30 tablet 1    celecoxib (CELEBREX) 200 MG capsule Take 1 capsule by mouth daily as needed for Pain 90 capsule 1    citalopram (CELEXA) 20 MG tablet Take 1 tablet by mouth daily 90 tablet 1     No current facility-administered medications for this visit. ALLERGIES  Allergies   Allergen Reactions    Vicodin [Hydrocodone-Acetaminophen]        PHYSICAL EXAM    /76   Pulse 72   Resp 16   Ht 5' 8\" (1.727 m)   Wt 161 lb (73 kg)   SpO2 98%   BMI 24.48 kg/m²     Physical Exam  Constitutional:       Appearance: Normal appearance. HENT:      Head: Normocephalic and atraumatic. Mouth/Throat:      Mouth: Mucous membranes are moist.      Pharynx: No oropharyngeal exudate or posterior oropharyngeal erythema. Eyes:      Extraocular Movements: Extraocular movements intact. Pupils: Pupils are equal, round, and reactive to light. Cardiovascular:      Rate and Rhythm: Normal rate and regular rhythm. Pulses: Normal pulses. Heart sounds: No murmur heard. No friction rub. No gallop. Skin:     General: Skin is warm and dry. Neurological:      General: No focal deficit present. Mental Status: He is alert. Psychiatric:         Mood and Affect: Mood normal.         Behavior: Behavior normal.         ASSESSMENT & PLAN    1. Sleep disorder  - doxepin (SILENOR) 3 MG TABS tablet; Take 1 tablet by mouth nightly as needed (sleep)  Dispense: 30 tablet; Refill: 1    Trazodone dizziness stopped  Trial of doxepin if not working consider [address] negative no signs of sleep apnea  Discussed to obtain labs  Return in about 4 months (around 5/12/2022).          Electronically signed by Cal Hernandez DO on 1/27/2022

## 2022-01-24 ENCOUNTER — TELEPHONE (OUTPATIENT)
Dept: FAMILY MEDICINE CLINIC | Age: 57
End: 2022-01-24

## 2022-01-27 ASSESSMENT — ENCOUNTER SYMPTOMS
SHORTNESS OF BREATH: 0
ABDOMINAL PAIN: 0
SORE THROAT: 0
WHEEZING: 0
NAUSEA: 0

## 2023-01-12 ENCOUNTER — OFFICE VISIT (OUTPATIENT)
Dept: FAMILY MEDICINE CLINIC | Age: 58
End: 2023-01-12
Payer: COMMERCIAL

## 2023-01-12 VITALS
DIASTOLIC BLOOD PRESSURE: 80 MMHG | WEIGHT: 174 LBS | HEART RATE: 62 BPM | HEIGHT: 68 IN | OXYGEN SATURATION: 97 % | SYSTOLIC BLOOD PRESSURE: 118 MMHG | BODY MASS INDEX: 26.37 KG/M2

## 2023-01-12 DIAGNOSIS — Z00.00 ANNUAL PHYSICAL EXAM: ICD-10-CM

## 2023-01-12 DIAGNOSIS — M17.0 PRIMARY OSTEOARTHRITIS OF BOTH KNEES: ICD-10-CM

## 2023-01-12 DIAGNOSIS — G47.9 SLEEP DISORDER: ICD-10-CM

## 2023-01-12 DIAGNOSIS — F41.9 ANXIETY: ICD-10-CM

## 2023-01-12 DIAGNOSIS — M54.41 ACUTE MIDLINE LOW BACK PAIN WITH RIGHT-SIDED SCIATICA: ICD-10-CM

## 2023-01-12 DIAGNOSIS — M89.8X8 MASS OF STERNUM: Primary | ICD-10-CM

## 2023-01-12 PROCEDURE — 99214 OFFICE O/P EST MOD 30 MIN: CPT | Performed by: STUDENT IN AN ORGANIZED HEALTH CARE EDUCATION/TRAINING PROGRAM

## 2023-01-12 PROCEDURE — 96372 THER/PROPH/DIAG INJ SC/IM: CPT | Performed by: STUDENT IN AN ORGANIZED HEALTH CARE EDUCATION/TRAINING PROGRAM

## 2023-01-12 RX ORDER — CITALOPRAM 20 MG/1
20 TABLET ORAL DAILY
Qty: 90 TABLET | Refills: 1 | Status: SHIPPED | OUTPATIENT
Start: 2023-01-12 | End: 2023-02-11

## 2023-01-12 RX ORDER — METHYLPREDNISOLONE ACETATE 40 MG/ML
40 INJECTION, SUSPENSION INTRA-ARTICULAR; INTRALESIONAL; INTRAMUSCULAR; SOFT TISSUE ONCE
Status: COMPLETED | OUTPATIENT
Start: 2023-01-12 | End: 2023-01-12

## 2023-01-12 RX ORDER — CELECOXIB 200 MG/1
200 CAPSULE ORAL DAILY PRN
Qty: 90 CAPSULE | Refills: 1 | Status: SHIPPED | OUTPATIENT
Start: 2023-01-12 | End: 2023-07-11

## 2023-01-12 RX ORDER — TRAZODONE HYDROCHLORIDE 50 MG/1
50 TABLET ORAL NIGHTLY
Qty: 90 TABLET | Refills: 1 | Status: SHIPPED | OUTPATIENT
Start: 2023-01-12 | End: 2023-07-11

## 2023-01-12 RX ADMIN — METHYLPREDNISOLONE ACETATE 40 MG: 40 INJECTION, SUSPENSION INTRA-ARTICULAR; INTRALESIONAL; INTRAMUSCULAR; SOFT TISSUE at 16:52

## 2023-01-12 ASSESSMENT — ENCOUNTER SYMPTOMS
SHORTNESS OF BREATH: 0
ABDOMINAL PAIN: 0
NAUSEA: 0
WHEEZING: 0
SORE THROAT: 0

## 2023-01-12 ASSESSMENT — PATIENT HEALTH QUESTIONNAIRE - PHQ9
SUM OF ALL RESPONSES TO PHQ QUESTIONS 1-9: 0
SUM OF ALL RESPONSES TO PHQ QUESTIONS 1-9: 0
SUM OF ALL RESPONSES TO PHQ9 QUESTIONS 1 & 2: 0
2. FEELING DOWN, DEPRESSED OR HOPELESS: 0
SUM OF ALL RESPONSES TO PHQ QUESTIONS 1-9: 0
SUM OF ALL RESPONSES TO PHQ QUESTIONS 1-9: 0
1. LITTLE INTEREST OR PLEASURE IN DOING THINGS: 0

## 2023-01-12 NOTE — PROGRESS NOTES
1/12/2023    [facility]    Chief Complaint   Patient presents with    Mass     Patient reports mass in the middle of chest, present since September, getting bigger, denies pain, itching or drainage, patient notes spot is sensitive    Insomnia     Difficulty sleeping    Pain     Patient reports bilateral knee pain and back pain       HPI  History was obtained from patient. Ten Jordan is a 65 y.o. male with a PMHx as listed below who presents today for acute complaint of mass on chest  First noticed 4 months ago, slwoly increasing. Not painful. Patient also notes difficulty sleeping at night, issues falling aslweep    1. Mass of sternum    2. Anxiety    3. Acute midline low back pain with right-sided sciatica    4. Sleep disorder    5. Primary osteoarthritis of both knees    6. Annual physical exam             REVIEW OF SYMPTOMS    Review of Systems   Constitutional:  Negative for chills and fatigue. HENT:  Negative for congestion and sore throat. Respiratory:  Negative for shortness of breath and wheezing. Cardiovascular:  Negative for chest pain and palpitations. Gastrointestinal:  Negative for abdominal pain and nausea. Genitourinary:  Negative for frequency and urgency. Neurological:  Negative for light-headedness. PAST MEDICAL HISTORY  History reviewed. No pertinent past medical history.     FAMILY HISTORY  Family History   Problem Relation Age of Onset    Parkinsonism Mother     Diabetes Father     Hypertension Father     Colon Cancer Maternal Grandfather     Colon Cancer Paternal Grandfather     Diabetes Other     Hypertension Other     Cancer Other         bladder       SOCIAL HISTORY  Social History     Socioeconomic History    Marital status:      Spouse name: None    Number of children: None    Years of education: None    Highest education level: None   Tobacco Use    Smoking status: Former     Packs/day: 1.00     Years: 19.00     Pack years: 19.00     Types: Cigarettes     Start date: 5     Quit date: 2004     Years since quittin.6    Smokeless tobacco: Never   Vaping Use    Vaping Use: Never used   Substance and Sexual Activity    Alcohol use: Yes     Alcohol/week: 1.0 standard drink     Types: 1 Cans of beer per week    Drug use: No    Sexual activity: Yes     Partners: Female     Social Determinants of Health     Financial Resource Strain: Low Risk     Difficulty of Paying Living Expenses: Not hard at all   Food Insecurity: No Food Insecurity    Worried About Running Out of Food in the Last Year: Never true    Ran Out of Food in the Last Year: Never true        SURGICAL HISTORY  Past Surgical History:   Procedure Laterality Date    COLONOSCOPY  07/10/2017    diverticulosis, 2mm polyp, hem- Dr Lorri Pih recomm 5 yr repeat    LITHOTRIPSY      TONSILLECTOMY AND ADENOIDECTOMY                   CURRENT MEDICATIONS  Current Outpatient Medications   Medication Sig Dispense Refill    citalopram (CELEXA) 20 MG tablet Take 1 tablet by mouth daily 90 tablet 1    celecoxib (CELEBREX) 200 MG capsule Take 1 capsule by mouth daily as needed for Pain 90 capsule 1    traZODone (DESYREL) 50 MG tablet Take 1 tablet by mouth nightly 90 tablet 1     Current Facility-Administered Medications   Medication Dose Route Frequency Provider Last Rate Last Admin    methylPREDNISolone acetate (DEPO-MEDROL) injection 40 mg  40 mg Intra-artICUlar Once Beraht R Cheryl, DO           ALLERGIES  Allergies   Allergen Reactions    Vicodin [Hydrocodone-Acetaminophen]        PHYSICAL EXAM    /80   Pulse 62   Ht 5' 8\" (1.727 m)   Wt 174 lb (78.9 kg)   SpO2 97%   BMI 26.46 kg/m²     Physical Exam  Constitutional:       Appearance: Normal appearance. HENT:      Head: Normocephalic and atraumatic. Eyes:      Extraocular Movements: Extraocular movements intact. Pupils: Pupils are equal, round, and reactive to light. Cardiovascular:      Rate and Rhythm: Normal rate and regular rhythm.       Pulses: Normal pulses. Heart sounds: No murmur heard. No friction rub. No gallop. Skin:     General: Skin is warm and dry. Neurological:      General: No focal deficit present. Mental Status: He is alert. Psychiatric:         Mood and Affect: Mood normal.         Behavior: Behavior normal.     2.5 cm mobile mass mid-sternum     ASSESSMENT & PLAN    1. Anxiety    - citalopram (CELEXA) 20 MG tablet; Take 1 tablet by mouth daily  Dispense: 90 tablet; Refill: 1    2. Acute midline low back pain with right-sided sciatica    - celecoxib (CELEBREX) 200 MG capsule; Take 1 capsule by mouth daily as needed for Pain  Dispense: 90 capsule; Refill: 1    3. Mass of sternum    - XR CHEST STANDARD (2 VW); Future  - US SOFT TISSUE LIMITED AREA; Future    4. Sleep disorder    - traZODone (DESYREL) 50 MG tablet; Take 1 tablet by mouth nightly  Dispense: 90 tablet; Refill: 1    5. Primary osteoarthritis of both knees    - methylPREDNISolone acetate (DEPO-MEDROL) injection 40 mg    6. Annual physical exam  - Comprehensive Metabolic Panel; Future  - CBC with Auto Differential; Future  - LIPID PANEL; Future    F/u imaging likely lipoma  F/u labs  Restart on trazodone     -40mg depo medrol with 2cc 2% lidocaine. Lateral injection LEFT knee, sterile technique without resistance. Landmarks including Patellar tendon, tibial tubercle. Return in about 3 months (around 4/12/2023).          Electronically signed by Alexi Oliva DO on 1/12/2023

## 2023-01-17 ENCOUNTER — HOSPITAL ENCOUNTER (OUTPATIENT)
Age: 58
Discharge: HOME | End: 2023-01-17
Payer: COMMERCIAL

## 2023-01-17 ENCOUNTER — HOSPITAL ENCOUNTER (OUTPATIENT)
Dept: ULTRASOUND IMAGING | Age: 58
Discharge: HOME | End: 2023-01-17
Payer: COMMERCIAL

## 2023-01-17 ENCOUNTER — HOSPITAL ENCOUNTER (OUTPATIENT)
Dept: GENERAL RADIOLOGY | Age: 58
Discharge: HOME | End: 2023-01-17
Payer: COMMERCIAL

## 2023-01-17 DIAGNOSIS — M89.8X8 MASS OF STERNUM: ICD-10-CM

## 2023-01-17 DIAGNOSIS — Z00.00 ANNUAL PHYSICAL EXAM: ICD-10-CM

## 2023-01-17 LAB
ALBUMIN SERPL-MCNC: 4.4 GM/DL (ref 3.4–5)
ALP BLD-CCNC: 83 IU/L (ref 40–128)
ALT SERPL-CCNC: 10 U/L (ref 10–40)
ANION GAP SERPL CALCULATED.3IONS-SCNC: 11 MMOL/L (ref 4–16)
AST SERPL-CCNC: 12 IU/L (ref 15–37)
BASOPHILS ABSOLUTE: 0.1 K/CU MM
BASOPHILS RELATIVE PERCENT: 0.8 % (ref 0–1)
BILIRUB SERPL-MCNC: 0.5 MG/DL (ref 0–1)
BUN BLDV-MCNC: 22 MG/DL (ref 6–23)
CALCIUM SERPL-MCNC: 9.4 MG/DL (ref 8.3–10.6)
CHLORIDE BLD-SCNC: 103 MMOL/L (ref 99–110)
CHOLESTEROL: 191 MG/DL
CO2: 26 MMOL/L (ref 21–32)
CREAT SERPL-MCNC: 1.2 MG/DL (ref 0.9–1.3)
DIFFERENTIAL TYPE: ABNORMAL
EOSINOPHILS ABSOLUTE: 0.1 K/CU MM
EOSINOPHILS RELATIVE PERCENT: 1.5 % (ref 0–3)
GFR SERPL CREATININE-BSD FRML MDRD: >60 ML/MIN/1.73M2
GLUCOSE BLD-MCNC: 90 MG/DL (ref 70–99)
HCT VFR BLD CALC: 43.4 % (ref 42–52)
HDLC SERPL-MCNC: 42 MG/DL
HEMOGLOBIN: 14.4 GM/DL (ref 13.5–18)
IMMATURE NEUTROPHIL %: 0.5 % (ref 0–0.43)
LDL CHOLESTEROL CALCULATED: 127 MG/DL
LYMPHOCYTES ABSOLUTE: 1.9 K/CU MM
LYMPHOCYTES RELATIVE PERCENT: 21.4 % (ref 24–44)
MCH RBC QN AUTO: 31 PG (ref 27–31)
MCHC RBC AUTO-ENTMCNC: 33.2 % (ref 32–36)
MCV RBC AUTO: 93.5 FL (ref 78–100)
MONOCYTES ABSOLUTE: 0.8 K/CU MM
MONOCYTES RELATIVE PERCENT: 8.9 % (ref 0–4)
NUCLEATED RBC %: 0 %
PDW BLD-RTO: 12.3 % (ref 11.7–14.9)
PLATELET # BLD: 446 K/CU MM (ref 140–440)
PMV BLD AUTO: 9.2 FL (ref 7.5–11.1)
POTASSIUM SERPL-SCNC: 4.4 MMOL/L (ref 3.5–5.1)
RBC # BLD: 4.64 M/CU MM (ref 4.6–6.2)
SEGMENTED NEUTROPHILS ABSOLUTE COUNT: 5.8 K/CU MM
SEGMENTED NEUTROPHILS RELATIVE PERCENT: 66.9 % (ref 36–66)
SODIUM BLD-SCNC: 140 MMOL/L (ref 135–145)
TOTAL IMMATURE NEUTOROPHIL: 0.04 K/CU MM
TOTAL NUCLEATED RBC: 0 K/CU MM
TOTAL PROTEIN: 6.7 GM/DL (ref 6.4–8.2)
TRIGL SERPL-MCNC: 108 MG/DL
WBC # BLD: 8.7 K/CU MM (ref 4–10.5)

## 2023-01-17 PROCEDURE — 80053 COMPREHEN METABOLIC PANEL: CPT

## 2023-01-17 PROCEDURE — 36415 COLL VENOUS BLD VENIPUNCTURE: CPT

## 2023-01-17 PROCEDURE — 80061 LIPID PANEL: CPT

## 2023-01-17 PROCEDURE — 85025 COMPLETE CBC W/AUTO DIFF WBC: CPT

## 2023-01-17 PROCEDURE — 76604 US EXAM CHEST: CPT

## 2023-01-17 PROCEDURE — 71046 X-RAY EXAM CHEST 2 VIEWS: CPT

## 2023-01-18 NOTE — RESULT ENCOUNTER NOTE
Please discuss with patient imaging shows lipoma as suspected, benign if causing discomfort for patient we can send general surgical eval otherwise monitor

## 2023-07-13 ENCOUNTER — OFFICE VISIT (OUTPATIENT)
Dept: FAMILY MEDICINE CLINIC | Age: 58
End: 2023-07-13

## 2023-07-13 VITALS
BODY MASS INDEX: 26.04 KG/M2 | WEIGHT: 171.8 LBS | DIASTOLIC BLOOD PRESSURE: 68 MMHG | HEART RATE: 53 BPM | HEIGHT: 68 IN | OXYGEN SATURATION: 95 % | RESPIRATION RATE: 16 BRPM | SYSTOLIC BLOOD PRESSURE: 108 MMHG

## 2023-07-13 DIAGNOSIS — G47.9 SLEEP DISORDER: ICD-10-CM

## 2023-07-13 DIAGNOSIS — F41.9 ANXIETY: ICD-10-CM

## 2023-07-13 DIAGNOSIS — M17.0 PRIMARY OSTEOARTHRITIS OF BOTH KNEES: Primary | ICD-10-CM

## 2023-07-13 DIAGNOSIS — R35.1 NOCTURIA: ICD-10-CM

## 2023-07-13 DIAGNOSIS — E78.2 MODERATE MIXED HYPERLIPIDEMIA NOT REQUIRING STATIN THERAPY: ICD-10-CM

## 2023-07-13 RX ORDER — METHYLPREDNISOLONE ACETATE 40 MG/ML
40 INJECTION, SUSPENSION INTRA-ARTICULAR; INTRALESIONAL; INTRAMUSCULAR; SOFT TISSUE ONCE
Status: COMPLETED | OUTPATIENT
Start: 2023-07-13 | End: 2023-07-13

## 2023-07-13 RX ORDER — TRAZODONE HYDROCHLORIDE 50 MG/1
50 TABLET ORAL NIGHTLY
Qty: 90 TABLET | Refills: 1 | Status: SHIPPED | OUTPATIENT
Start: 2023-07-13 | End: 2024-01-09

## 2023-07-13 RX ADMIN — METHYLPREDNISOLONE ACETATE 40 MG: 40 INJECTION, SUSPENSION INTRA-ARTICULAR; INTRALESIONAL; INTRAMUSCULAR; SOFT TISSUE at 16:06

## 2023-07-13 RX ADMIN — METHYLPREDNISOLONE ACETATE 40 MG: 40 INJECTION, SUSPENSION INTRA-ARTICULAR; INTRALESIONAL; INTRAMUSCULAR; SOFT TISSUE at 16:08

## 2023-07-13 SDOH — ECONOMIC STABILITY: TRANSPORTATION INSECURITY
IN THE PAST 12 MONTHS, HAS LACK OF TRANSPORTATION KEPT YOU FROM MEETINGS, WORK, OR FROM GETTING THINGS NEEDED FOR DAILY LIVING?: NO

## 2023-07-13 SDOH — ECONOMIC STABILITY: HOUSING INSECURITY
IN THE LAST 12 MONTHS, WAS THERE A TIME WHEN YOU DID NOT HAVE A STEADY PLACE TO SLEEP OR SLEPT IN A SHELTER (INCLUDING NOW)?: NO

## 2023-07-13 SDOH — ECONOMIC STABILITY: INCOME INSECURITY: HOW HARD IS IT FOR YOU TO PAY FOR THE VERY BASICS LIKE FOOD, HOUSING, MEDICAL CARE, AND HEATING?: NOT HARD AT ALL

## 2023-07-13 SDOH — ECONOMIC STABILITY: FOOD INSECURITY: WITHIN THE PAST 12 MONTHS, THE FOOD YOU BOUGHT JUST DIDN'T LAST AND YOU DIDN'T HAVE MONEY TO GET MORE.: NEVER TRUE

## 2023-07-13 SDOH — ECONOMIC STABILITY: FOOD INSECURITY: WITHIN THE PAST 12 MONTHS, YOU WORRIED THAT YOUR FOOD WOULD RUN OUT BEFORE YOU GOT MONEY TO BUY MORE.: NEVER TRUE

## 2023-07-13 NOTE — PROGRESS NOTES
2023    [facility]    Chief Complaint   Patient presents with    6 Month Follow-Up     Would like injection in both knees        HPI  History was obtained from patient. Yumiko Cardenas is a 63 y.o. male with a PMHx as listed below who presents today for 6 month follow up chronic conditions. No acute complaints. Chronic arthritis interested in cortisone injections bilaterally knee    Mood stable on current regimen, sleep stable  1. Primary osteoarthritis of both knees    2. Sleep disorder    3. Anxiety    4. Nocturia    5. Moderate mixed hyperlipidemia not requiring statin therapy             REVIEW OF SYMPTOMS    Review of Systems   Constitutional:  Negative for chills and fatigue. HENT:  Negative for congestion and sore throat. Respiratory:  Negative for shortness of breath and wheezing. Cardiovascular:  Negative for chest pain and palpitations. Gastrointestinal:  Negative for abdominal pain and nausea. Genitourinary:  Negative for frequency and urgency. Neurological:  Negative for light-headedness. PAST MEDICAL HISTORY  No past medical history on file. FAMILY HISTORY  Family History   Problem Relation Age of Onset    Parkinsonism Mother     Diabetes Father     Hypertension Father     Colon Cancer Maternal Grandfather     Colon Cancer Paternal Grandfather     Diabetes Other     Hypertension Other     Cancer Other         bladder       SOCIAL HISTORY  Social History     Socioeconomic History    Marital status:    Tobacco Use    Smoking status: Former     Packs/day: 1.00     Years: 19.00     Pack years: 19.00     Types: Cigarettes     Start date: 5     Quit date: 2004     Years since quittin.1    Smokeless tobacco: Never   Vaping Use    Vaping Use: Never used   Substance and Sexual Activity    Alcohol use:  Yes     Alcohol/week: 1.0 standard drink     Types: 1 Cans of beer per week    Drug use: No    Sexual activity: Yes     Partners: Female        SURGICAL HISTORY  Past

## 2023-07-16 ASSESSMENT — ENCOUNTER SYMPTOMS
SORE THROAT: 0
WHEEZING: 0
NAUSEA: 0
SHORTNESS OF BREATH: 0
ABDOMINAL PAIN: 0

## 2023-10-15 DIAGNOSIS — F41.9 ANXIETY: ICD-10-CM

## 2023-10-16 RX ORDER — CITALOPRAM 20 MG/1
20 TABLET ORAL DAILY
Qty: 90 TABLET | Refills: 1 | Status: SHIPPED | OUTPATIENT
Start: 2023-10-16

## 2024-02-13 ENCOUNTER — OFFICE VISIT (OUTPATIENT)
Dept: FAMILY MEDICINE CLINIC | Age: 59
End: 2024-02-13
Payer: COMMERCIAL

## 2024-02-13 VITALS
BODY MASS INDEX: 26.37 KG/M2 | HEART RATE: 57 BPM | SYSTOLIC BLOOD PRESSURE: 122 MMHG | DIASTOLIC BLOOD PRESSURE: 72 MMHG | WEIGHT: 174 LBS | OXYGEN SATURATION: 96 % | HEIGHT: 68 IN

## 2024-02-13 DIAGNOSIS — Z12.11 COLON CANCER SCREENING: ICD-10-CM

## 2024-02-13 DIAGNOSIS — N40.0 BENIGN PROSTATIC HYPERPLASIA, UNSPECIFIED WHETHER LOWER URINARY TRACT SYMPTOMS PRESENT: ICD-10-CM

## 2024-02-13 DIAGNOSIS — E78.2 MODERATE MIXED HYPERLIPIDEMIA NOT REQUIRING STATIN THERAPY: ICD-10-CM

## 2024-02-13 DIAGNOSIS — M17.12 PRIMARY OSTEOARTHRITIS OF LEFT KNEE: Primary | ICD-10-CM

## 2024-02-13 DIAGNOSIS — F41.9 ANXIETY: ICD-10-CM

## 2024-02-13 DIAGNOSIS — M54.41 ACUTE MIDLINE LOW BACK PAIN WITH RIGHT-SIDED SCIATICA: ICD-10-CM

## 2024-02-13 DIAGNOSIS — G47.9 SLEEP DISORDER: ICD-10-CM

## 2024-02-13 LAB
ALBUMIN SERPL-MCNC: 4.2 G/DL (ref 3.4–5)
ALBUMIN/GLOB SERPL: 1.8 {RATIO} (ref 1.1–2.2)
ALP SERPL-CCNC: 92 U/L (ref 40–129)
ALT SERPL-CCNC: 14 U/L (ref 10–40)
ANION GAP SERPL CALCULATED.3IONS-SCNC: 10 MMOL/L (ref 3–16)
AST SERPL-CCNC: 15 U/L (ref 15–37)
BILIRUB SERPL-MCNC: 0.6 MG/DL (ref 0–1)
BUN SERPL-MCNC: 17 MG/DL (ref 7–20)
CALCIUM SERPL-MCNC: 8.8 MG/DL (ref 8.3–10.6)
CHLORIDE SERPL-SCNC: 102 MMOL/L (ref 99–110)
CHOLEST SERPL-MCNC: 206 MG/DL (ref 0–199)
CO2 SERPL-SCNC: 28 MMOL/L (ref 21–32)
CREAT SERPL-MCNC: 0.9 MG/DL (ref 0.9–1.3)
GFR SERPLBLD CREATININE-BSD FMLA CKD-EPI: >60 ML/MIN/{1.73_M2}
GLUCOSE SERPL-MCNC: 94 MG/DL (ref 70–99)
HDLC SERPL-MCNC: 41 MG/DL (ref 40–60)
LDL CHOLESTEROL CALCULATED: 144 MG/DL
POTASSIUM SERPL-SCNC: 4.6 MMOL/L (ref 3.5–5.1)
PROT SERPL-MCNC: 6.6 G/DL (ref 6.4–8.2)
PSA SERPL DL<=0.01 NG/ML-MCNC: 1.34 NG/ML (ref 0–4)
SODIUM SERPL-SCNC: 140 MMOL/L (ref 136–145)
TRIGL SERPL-MCNC: 107 MG/DL (ref 0–150)
VLDLC SERPL CALC-MCNC: 21 MG/DL

## 2024-02-13 PROCEDURE — 99214 OFFICE O/P EST MOD 30 MIN: CPT | Performed by: STUDENT IN AN ORGANIZED HEALTH CARE EDUCATION/TRAINING PROGRAM

## 2024-02-13 PROCEDURE — 20610 DRAIN/INJ JOINT/BURSA W/O US: CPT | Performed by: STUDENT IN AN ORGANIZED HEALTH CARE EDUCATION/TRAINING PROGRAM

## 2024-02-13 RX ORDER — CELECOXIB 200 MG/1
200 CAPSULE ORAL DAILY PRN
Qty: 90 CAPSULE | Refills: 1 | Status: SHIPPED | OUTPATIENT
Start: 2024-02-13 | End: 2024-08-11

## 2024-02-13 RX ORDER — METHYLPREDNISOLONE ACETATE 40 MG/ML
40 INJECTION, SUSPENSION INTRA-ARTICULAR; INTRALESIONAL; INTRAMUSCULAR; SOFT TISSUE ONCE
Status: COMPLETED | OUTPATIENT
Start: 2024-02-13 | End: 2024-02-13

## 2024-02-13 RX ORDER — CITALOPRAM 20 MG/1
20 TABLET ORAL DAILY
Qty: 90 TABLET | Refills: 1 | Status: SHIPPED | OUTPATIENT
Start: 2024-02-13

## 2024-02-13 RX ORDER — TRAZODONE HYDROCHLORIDE 50 MG/1
50 TABLET ORAL NIGHTLY
Qty: 90 TABLET | Refills: 1 | Status: SHIPPED | OUTPATIENT
Start: 2024-02-13 | End: 2024-08-11

## 2024-02-13 RX ADMIN — METHYLPREDNISOLONE ACETATE 40 MG: 40 INJECTION, SUSPENSION INTRA-ARTICULAR; INTRALESIONAL; INTRAMUSCULAR; SOFT TISSUE at 07:40

## 2024-02-13 NOTE — PROGRESS NOTES
Injection given in left knee by Dr. Luna.   
(19.4 ttl pk-yrs)     Types: Cigarettes     Start date:      Quit date: 2004     Years since quittin.7    Smokeless tobacco: Never   Vaping Use    Vaping Use: Never used   Substance and Sexual Activity    Alcohol use: Yes     Alcohol/week: 1.0 standard drink of alcohol     Types: 1 Cans of beer per week    Drug use: No    Sexual activity: Yes     Partners: Female     Social Determinants of Health     Financial Resource Strain: Low Risk  (2022)    Overall Financial Resource Strain (CARDIA)     Difficulty of Paying Living Expenses: Not hard at all        SURGICAL HISTORY  Past Surgical History:   Procedure Laterality Date    COLONOSCOPY  07/10/2017    diverticulosis, 2mm polyp, hem- Dr Doyle recomm 5 yr repeat    LITHOTRIPSY      TONSILLECTOMY AND ADENOIDECTOMY                   CURRENT MEDICATIONS  Current Outpatient Medications   Medication Sig Dispense Refill    celecoxib (CELEBREX) 200 MG capsule Take 1 capsule by mouth daily as needed for Pain 90 capsule 1    citalopram (CELEXA) 20 MG tablet Take 1 tablet by mouth daily 90 tablet 1    traZODone (DESYREL) 50 MG tablet Take 1 tablet by mouth nightly 90 tablet 1     No current facility-administered medications for this visit.       ALLERGIES  Allergies   Allergen Reactions    Vicodin [Hydrocodone-Acetaminophen]        PHYSICAL EXAM    /72 (Site: Left Upper Arm, Position: Sitting, Cuff Size: Medium Adult)   Pulse 57   Ht 1.727 m (5' 8\")   Wt 78.9 kg (174 lb)   SpO2 96%   BMI 26.46 kg/m²     Physical Exam  Constitutional:       Appearance: Normal appearance.   HENT:      Head: Normocephalic and atraumatic.   Eyes:      Extraocular Movements: Extraocular movements intact.      Pupils: Pupils are equal, round, and reactive to light.   Cardiovascular:      Rate and Rhythm: Normal rate and regular rhythm.      Pulses: Normal pulses.      Heart sounds: No murmur heard.     No friction rub. No gallop.   Pulmonary:      Effort: Pulmonary effort

## 2024-04-12 ENCOUNTER — OFFICE VISIT (OUTPATIENT)
Dept: FAMILY MEDICINE CLINIC | Age: 59
End: 2024-04-12
Payer: COMMERCIAL

## 2024-04-12 VITALS
HEART RATE: 57 BPM | DIASTOLIC BLOOD PRESSURE: 82 MMHG | SYSTOLIC BLOOD PRESSURE: 136 MMHG | OXYGEN SATURATION: 97 % | BODY MASS INDEX: 25.31 KG/M2 | RESPIRATION RATE: 16 BRPM | HEIGHT: 68 IN | WEIGHT: 167 LBS | TEMPERATURE: 98.4 F

## 2024-04-12 DIAGNOSIS — A08.4 VIRAL GASTROENTERITIS: Primary | ICD-10-CM

## 2024-04-12 PROCEDURE — 99213 OFFICE O/P EST LOW 20 MIN: CPT | Performed by: NURSE PRACTITIONER

## 2024-04-12 RX ORDER — ONDANSETRON 4 MG/1
4 TABLET, ORALLY DISINTEGRATING ORAL EVERY 8 HOURS PRN
Qty: 15 TABLET | Refills: 0 | Status: SHIPPED | OUTPATIENT
Start: 2024-04-12

## 2024-04-12 ASSESSMENT — ENCOUNTER SYMPTOMS
RHINORRHEA: 0
CHEST TIGHTNESS: 0
WHEEZING: 0
FLATUS: 0
BLOATING: 0
NAUSEA: 1
SINUS PRESSURE: 0
COUGH: 0
ABDOMINAL PAIN: 0
VOMITING: 1
SINUS PAIN: 0
DIARRHEA: 1
SORE THROAT: 0
SHORTNESS OF BREATH: 0

## 2024-04-12 NOTE — PROGRESS NOTES
capsule Take 1 capsule by mouth daily as needed for Pain 90 capsule 1    citalopram (CELEXA) 20 MG tablet Take 1 tablet by mouth daily 90 tablet 1    traZODone (DESYREL) 50 MG tablet Take 1 tablet by mouth nightly 90 tablet 1     No current facility-administered medications for this visit.        Past medical, family,surgical history reviewed today.     Objective:  /82   Pulse 57   Temp 98.4 °F (36.9 °C)   Resp 16   Ht 1.727 m (5' 8\")   Wt 75.8 kg (167 lb)   SpO2 97%   BMI 25.39 kg/m²   BP Readings from Last 3 Encounters:   04/12/24 136/82   02/13/24 122/72   07/13/23 108/68     Wt Readings from Last 3 Encounters:   04/12/24 75.8 kg (167 lb)   02/13/24 78.9 kg (174 lb)   07/13/23 77.9 kg (171 lb 12.8 oz)         Physical Exam  Constitutional:       Appearance: Normal appearance.   HENT:      Head: Normocephalic.      Right Ear: Tympanic membrane, ear canal and external ear normal.      Left Ear: Tympanic membrane, ear canal and external ear normal.      Nose: Nose normal.      Mouth/Throat:      Lips: Pink.      Mouth: Mucous membranes are moist.      Pharynx: Oropharynx is clear.   Cardiovascular:      Rate and Rhythm: Normal rate and regular rhythm.      Heart sounds: Normal heart sounds.   Pulmonary:      Effort: Pulmonary effort is normal.      Breath sounds: Normal breath sounds.   Musculoskeletal:      Cervical back: Neck supple.   Skin:     General: Skin is warm and dry.   Neurological:      Mental Status: He is alert and oriented to person, place, and time.   Psychiatric:         Mood and Affect: Mood normal.         Behavior: Behavior normal.         Lab Results   Component Value Date    WBC 8.7 01/17/2023    HGB 14.4 01/17/2023    HCT 43.4 01/17/2023    MCV 93.5 01/17/2023     (H) 01/17/2023     Lab Results   Component Value Date     02/13/2024    K 4.6 02/13/2024     02/13/2024    CO2 28 02/13/2024    BUN 17 02/13/2024    CREATININE 0.9 02/13/2024    GLUCOSE 94 02/13/2024

## 2024-07-19 ENCOUNTER — OFFICE VISIT (OUTPATIENT)
Dept: FAMILY MEDICINE CLINIC | Age: 59
End: 2024-07-19
Payer: COMMERCIAL

## 2024-07-19 VITALS
WEIGHT: 163.9 LBS | HEART RATE: 85 BPM | RESPIRATION RATE: 16 BRPM | TEMPERATURE: 98.3 F | SYSTOLIC BLOOD PRESSURE: 128 MMHG | HEIGHT: 68 IN | BODY MASS INDEX: 24.84 KG/M2 | DIASTOLIC BLOOD PRESSURE: 80 MMHG | OXYGEN SATURATION: 95 %

## 2024-07-19 DIAGNOSIS — K63.5 POLYP OF COLON, UNSPECIFIED PART OF COLON, UNSPECIFIED TYPE: Primary | ICD-10-CM

## 2024-07-19 DIAGNOSIS — A08.4 VIRAL GASTROENTERITIS: ICD-10-CM

## 2024-07-19 DIAGNOSIS — J44.9 CHRONIC OBSTRUCTIVE PULMONARY DISEASE, UNSPECIFIED COPD TYPE (HCC): ICD-10-CM

## 2024-07-19 DIAGNOSIS — R06.02 SHORTNESS OF BREATH: ICD-10-CM

## 2024-07-19 PROCEDURE — 99214 OFFICE O/P EST MOD 30 MIN: CPT | Performed by: STUDENT IN AN ORGANIZED HEALTH CARE EDUCATION/TRAINING PROGRAM

## 2024-07-19 PROCEDURE — 93000 ELECTROCARDIOGRAM COMPLETE: CPT | Performed by: STUDENT IN AN ORGANIZED HEALTH CARE EDUCATION/TRAINING PROGRAM

## 2024-07-19 RX ORDER — ALBUTEROL SULFATE 90 UG/1
2 AEROSOL, METERED RESPIRATORY (INHALATION) 4 TIMES DAILY PRN
Qty: 54 G | Refills: 1 | Status: SHIPPED | OUTPATIENT
Start: 2024-07-19

## 2024-07-19 RX ORDER — ONDANSETRON 4 MG/1
4 TABLET, ORALLY DISINTEGRATING ORAL EVERY 8 HOURS PRN
Qty: 15 TABLET | Refills: 0 | Status: SHIPPED | OUTPATIENT
Start: 2024-07-19

## 2024-07-19 RX ORDER — FLUTICASONE FUROATE, UMECLIDINIUM BROMIDE AND VILANTEROL TRIFENATATE 100; 62.5; 25 UG/1; UG/1; UG/1
1 POWDER RESPIRATORY (INHALATION) DAILY
Qty: 1 EACH | Refills: 0 | Status: SHIPPED | COMMUNITY
Start: 2024-07-19 | End: 2024-08-02

## 2024-07-19 NOTE — PATIENT INSTRUCTIONS
Imaging Locations:    1) [facility]       [address]       [place], [place] [address]      Phone [phone]  Mammography, CT, MRI, X-Rays, Ultra Sound, breast biopsy    2) [place] [id number] [phone]  Nuclear Medicine, CT, MRI, X-Rays, Ultra Sound, breast biopsy

## 2024-07-19 NOTE — PROGRESS NOTES
7/23/2024    Aly Figueroa    Chief Complaint   Patient presents with    Nausea     Nausea began Wednesday.     Headache     Headache. Sweats.     Shortness of Breath     Shortness of breath.     Cough     Unproductive cough.        HPI  History was obtained from patient.  Aly is a 59 y.o. male with a PMHx as listed below who presents today acute complaints nausea, headache, sob    SOB he walkes up in middle of night gasping for air, denies wheezing. Admits to some chest tightness  Productive cough but usually a dry cough.   This is causing him to have difficulty sleeping  Former smoker quit roughly 20 years ago  Over 20 year pack history    He has chronic nausea      1. Polyp of colon, unspecified part of colon, unspecified type    2. Viral gastroenteritis    3. Shortness of breath    4. Chronic obstructive pulmonary disease, unspecified COPD type (HCC)             REVIEW OF SYMPTOMS    Review of Systems   Constitutional:  Negative for chills and fatigue.   HENT:  Negative for congestion and sore throat.    Respiratory:  Positive for cough, shortness of breath and wheezing.    Cardiovascular:  Negative for chest pain and palpitations.   Gastrointestinal:  Negative for abdominal pain and nausea.   Genitourinary:  Negative for frequency and urgency.   Neurological:  Negative for light-headedness.       PAST MEDICAL HISTORY  No past medical history on file.    FAMILY HISTORY  Family History   Problem Relation Age of Onset    Parkinsonism Mother     Diabetes Father     Hypertension Father     Colon Cancer Maternal Grandfather     Colon Cancer Paternal Grandfather     Diabetes Other     Hypertension Other     Cancer Other         bladder       SOCIAL HISTORY  Social History     Socioeconomic History    Marital status:      Spouse name: None    Number of children: None    Years of education: None    Highest education level: None   Tobacco Use    Smoking status: Former     Current packs/day: 0.00     Average

## 2024-07-23 RX ORDER — FLUTICASONE FUROATE, UMECLIDINIUM BROMIDE AND VILANTEROL TRIFENATATE 100; 62.5; 25 UG/1; UG/1; UG/1
1 POWDER RESPIRATORY (INHALATION) DAILY
Qty: 1 EACH | Refills: 0 | Status: SHIPPED | COMMUNITY
Start: 2024-07-23

## 2024-07-23 ASSESSMENT — ENCOUNTER SYMPTOMS
NAUSEA: 0
ABDOMINAL PAIN: 0
COUGH: 1
WHEEZING: 1
SORE THROAT: 0
SHORTNESS OF BREATH: 1

## 2024-12-19 DIAGNOSIS — G47.9 SLEEP DISORDER: ICD-10-CM

## 2024-12-19 DIAGNOSIS — F41.9 ANXIETY: ICD-10-CM

## 2024-12-20 RX ORDER — TRAZODONE HYDROCHLORIDE 50 MG/1
50 TABLET ORAL NIGHTLY
Qty: 90 TABLET | Refills: 1 | OUTPATIENT
Start: 2024-12-20

## 2024-12-20 RX ORDER — CITALOPRAM HYDROBROMIDE 20 MG/1
20 TABLET ORAL DAILY
Qty: 90 TABLET | Refills: 1 | OUTPATIENT
Start: 2024-12-20

## 2025-01-15 DIAGNOSIS — F41.9 ANXIETY: ICD-10-CM

## 2025-01-15 DIAGNOSIS — G47.9 SLEEP DISORDER: ICD-10-CM

## 2025-01-16 RX ORDER — TRAZODONE HYDROCHLORIDE 50 MG/1
50 TABLET, FILM COATED ORAL NIGHTLY
Qty: 90 TABLET | Refills: 1 | Status: SHIPPED | OUTPATIENT
Start: 2025-01-16 | End: 2025-07-15

## 2025-01-16 RX ORDER — CITALOPRAM HYDROBROMIDE 20 MG/1
20 TABLET ORAL DAILY
Qty: 90 TABLET | Refills: 1 | Status: SHIPPED | OUTPATIENT
Start: 2025-01-16

## 2025-02-10 SDOH — ECONOMIC STABILITY: FOOD INSECURITY: WITHIN THE PAST 12 MONTHS, THE FOOD YOU BOUGHT JUST DIDN'T LAST AND YOU DIDN'T HAVE MONEY TO GET MORE.: NEVER TRUE

## 2025-02-10 SDOH — ECONOMIC STABILITY: INCOME INSECURITY: IN THE LAST 12 MONTHS, WAS THERE A TIME WHEN YOU WERE NOT ABLE TO PAY THE MORTGAGE OR RENT ON TIME?: NO

## 2025-02-10 SDOH — ECONOMIC STABILITY: TRANSPORTATION INSECURITY
IN THE PAST 12 MONTHS, HAS THE LACK OF TRANSPORTATION KEPT YOU FROM MEDICAL APPOINTMENTS OR FROM GETTING MEDICATIONS?: NO

## 2025-02-10 SDOH — ECONOMIC STABILITY: FOOD INSECURITY: WITHIN THE PAST 12 MONTHS, YOU WORRIED THAT YOUR FOOD WOULD RUN OUT BEFORE YOU GOT MONEY TO BUY MORE.: NEVER TRUE

## 2025-02-10 ASSESSMENT — PATIENT HEALTH QUESTIONNAIRE - PHQ9
SUM OF ALL RESPONSES TO PHQ QUESTIONS 1-9: 0
SUM OF ALL RESPONSES TO PHQ9 QUESTIONS 1 & 2: 0
SUM OF ALL RESPONSES TO PHQ QUESTIONS 1-9: 0
SUM OF ALL RESPONSES TO PHQ QUESTIONS 1-9: 0
SUM OF ALL RESPONSES TO PHQ9 QUESTIONS 1 & 2: 0
SUM OF ALL RESPONSES TO PHQ QUESTIONS 1-9: 0
2. FEELING DOWN, DEPRESSED OR HOPELESS: NOT AT ALL
1. LITTLE INTEREST OR PLEASURE IN DOING THINGS: NOT AT ALL
2. FEELING DOWN, DEPRESSED OR HOPELESS: NOT AT ALL
1. LITTLE INTEREST OR PLEASURE IN DOING THINGS: NOT AT ALL

## 2025-02-11 ENCOUNTER — OFFICE VISIT (OUTPATIENT)
Dept: FAMILY MEDICINE CLINIC | Age: 60
End: 2025-02-11
Payer: COMMERCIAL

## 2025-02-11 VITALS
BODY MASS INDEX: 25.76 KG/M2 | DIASTOLIC BLOOD PRESSURE: 76 MMHG | HEIGHT: 68 IN | HEART RATE: 55 BPM | WEIGHT: 170 LBS | SYSTOLIC BLOOD PRESSURE: 120 MMHG | OXYGEN SATURATION: 97 %

## 2025-02-11 DIAGNOSIS — Z00.00 ANNUAL PHYSICAL EXAM: Primary | ICD-10-CM

## 2025-02-11 DIAGNOSIS — F41.1 GENERALIZED ANXIETY DISORDER: ICD-10-CM

## 2025-02-11 DIAGNOSIS — E78.2 MODERATE MIXED HYPERLIPIDEMIA NOT REQUIRING STATIN THERAPY: ICD-10-CM

## 2025-02-11 DIAGNOSIS — N40.0 BENIGN PROSTATIC HYPERPLASIA, UNSPECIFIED WHETHER LOWER URINARY TRACT SYMPTOMS PRESENT: ICD-10-CM

## 2025-02-11 PROBLEM — J44.9 CHRONIC OBSTRUCTIVE PULMONARY DISEASE, UNSPECIFIED COPD TYPE (HCC): Status: RESOLVED | Noted: 2025-02-11 | Resolved: 2025-02-11

## 2025-02-11 PROBLEM — J44.9 CHRONIC OBSTRUCTIVE PULMONARY DISEASE, UNSPECIFIED COPD TYPE (HCC): Status: ACTIVE | Noted: 2025-02-11

## 2025-02-11 LAB
ALBUMIN SERPL-MCNC: 4.3 G/DL (ref 3.4–5)
ALBUMIN/GLOB SERPL: 1.7 {RATIO} (ref 1.1–2.2)
ALP SERPL-CCNC: 99 U/L (ref 40–129)
ALT SERPL-CCNC: 15 U/L (ref 10–40)
ANION GAP SERPL CALCULATED.3IONS-SCNC: 8 MMOL/L (ref 3–16)
AST SERPL-CCNC: 16 U/L (ref 15–37)
BILIRUB SERPL-MCNC: 0.4 MG/DL (ref 0–1)
BUN SERPL-MCNC: 23 MG/DL (ref 7–20)
CALCIUM SERPL-MCNC: 9.8 MG/DL (ref 8.3–10.6)
CHLORIDE SERPL-SCNC: 103 MMOL/L (ref 99–110)
CHOLEST SERPL-MCNC: 211 MG/DL (ref 0–199)
CO2 SERPL-SCNC: 29 MMOL/L (ref 21–32)
CREAT SERPL-MCNC: 1 MG/DL (ref 0.9–1.3)
GFR SERPLBLD CREATININE-BSD FMLA CKD-EPI: 87 ML/MIN/{1.73_M2}
GLUCOSE SERPL-MCNC: 88 MG/DL (ref 70–99)
HDLC SERPL-MCNC: 44 MG/DL (ref 40–60)
LDL CHOLESTEROL: 149 MG/DL
POTASSIUM SERPL-SCNC: 4.6 MMOL/L (ref 3.5–5.1)
PROT SERPL-MCNC: 6.9 G/DL (ref 6.4–8.2)
PSA SERPL DL<=0.01 NG/ML-MCNC: 1.93 NG/ML (ref 0–4)
SODIUM SERPL-SCNC: 140 MMOL/L (ref 136–145)
TRIGL SERPL-MCNC: 91 MG/DL (ref 0–150)
VLDLC SERPL CALC-MCNC: 18 MG/DL

## 2025-02-11 PROCEDURE — 99396 PREV VISIT EST AGE 40-64: CPT | Performed by: STUDENT IN AN ORGANIZED HEALTH CARE EDUCATION/TRAINING PROGRAM

## 2025-02-11 ASSESSMENT — ENCOUNTER SYMPTOMS
SORE THROAT: 0
SHORTNESS OF BREATH: 0
WHEEZING: 0
NAUSEA: 0
ABDOMINAL PAIN: 0

## 2025-02-11 NOTE — PROGRESS NOTES
Rate and Rhythm: Normal rate and regular rhythm.      Pulses: Normal pulses.      Heart sounds: No murmur heard.     No friction rub. No gallop.   Pulmonary:      Effort: Pulmonary effort is normal.      Breath sounds: Normal breath sounds.   Skin:     General: Skin is warm and dry.      Findings: No lesion.   Neurological:      General: No focal deficit present.      Mental Status: He is alert.   Psychiatric:         Mood and Affect: Mood normal.         Behavior: Behavior normal.         ASSESSMENT & PLAN    Diagnoses and all orders for this visit:  Annual physical exam  Moderate mixed hyperlipidemia not requiring statin therapy  -     Lipid, Fasting; Future  -     Comprehensive Metabolic Panel; Future  Benign prostatic hyperplasia, unspecified whether lower urinary tract symptoms present  -     PSA, Prostatic Specific Antigen ([facility]); Future  Generalized anxiety disorder     BP well controlled  Mood stable on current regimen  Obtain annual labs  Counseled on vaccinations, age appropriate screenings    Assessment & Plan      Return in about 9 years (around 2/11/2034).         The patient (or guardian, if applicable) and other individuals in attendance with the patient were advised that Artificial Intelligence will be utilized during this visit to record, process the conversation to generate a clinical note, and support improvement of the AI technology. The patient (or guardian, if applicable) and other individuals in attendance at the appointment consented to the use of AI, including the recording.                    Electronically signed by Christopher Luna DO on 2/11/2025

## 2025-02-14 NOTE — RESULT ENCOUNTER NOTE
Abhi's labs are normal except a little elevation in cholesterol. Recommend we start low dose crestor 5mg nightly to help reduce his CV risks.

## 2025-02-18 RX ORDER — ROSUVASTATIN CALCIUM 5 MG/1
5 TABLET, COATED ORAL NIGHTLY
Qty: 90 TABLET | Refills: 1 | Status: SHIPPED | OUTPATIENT
Start: 2025-02-18 | End: 2025-08-17

## 2025-02-19 ENCOUNTER — TELEPHONE (OUTPATIENT)
Dept: FAMILY MEDICINE CLINIC | Age: 60
End: 2025-02-19

## 2025-02-19 DIAGNOSIS — M54.41 ACUTE MIDLINE LOW BACK PAIN WITH RIGHT-SIDED SCIATICA: Primary | ICD-10-CM

## 2025-02-19 NOTE — TELEPHONE ENCOUNTER
PT HAD APPT WITH YOU 2/11 AND FORGOT TO ASK IF HE COULD GET GABAPENTIN FOR THE PAIN IN HIS KNEES. CAN THIS BE SENT TO PHARM FOR HIM?    LV-2/11  NA-11/11    SONU RIZO

## 2025-02-20 NOTE — TELEPHONE ENCOUNTER
I don't think we have ever prescribed gabapentin for knees in the past. Has he tried ibuprofen? Recommend trial ibuprofen/tylenol.

## 2025-02-21 RX ORDER — GABAPENTIN 100 MG/1
100 CAPSULE ORAL 3 TIMES DAILY
Qty: 90 CAPSULE | Refills: 0 | Status: SHIPPED | OUTPATIENT
Start: 2025-02-21 | End: 2025-03-23

## 2025-02-21 NOTE — TELEPHONE ENCOUNTER
WIFE CALLED BACK FOR PT AND COULDN'T REACH CLINICAL ON PHONE-LET WIFE  KNOW MED WAS SENT TO PHARM AND APPT WAS MADE.

## 2025-03-06 ENCOUNTER — OFFICE VISIT (OUTPATIENT)
Dept: FAMILY MEDICINE CLINIC | Age: 60
End: 2025-03-06

## 2025-03-06 VITALS
HEART RATE: 56 BPM | OXYGEN SATURATION: 96 % | SYSTOLIC BLOOD PRESSURE: 120 MMHG | BODY MASS INDEX: 26.05 KG/M2 | RESPIRATION RATE: 16 BRPM | DIASTOLIC BLOOD PRESSURE: 78 MMHG | WEIGHT: 171.9 LBS | HEIGHT: 68 IN

## 2025-03-06 DIAGNOSIS — M17.12 PRIMARY OSTEOARTHRITIS OF LEFT KNEE: Primary | ICD-10-CM

## 2025-03-06 RX ORDER — METHYLPREDNISOLONE ACETATE 40 MG/ML
40 INJECTION, SUSPENSION INTRA-ARTICULAR; INTRALESIONAL; INTRAMUSCULAR; SOFT TISSUE ONCE
Status: COMPLETED | OUTPATIENT
Start: 2025-03-06 | End: 2025-03-06

## 2025-03-06 RX ADMIN — METHYLPREDNISOLONE ACETATE 40 MG: 40 INJECTION, SUSPENSION INTRA-ARTICULAR; INTRALESIONAL; INTRAMUSCULAR; SOFT TISSUE at 08:27

## 2025-03-06 NOTE — PROGRESS NOTES
-40mg depo medrol with 2cc 0.5% marcaine. Lateral injection, sterile technique without resistance. Landmarks including Patellar tendon, tibial tubercle.   LEFT KNEE

## 2025-07-02 DIAGNOSIS — G47.9 SLEEP DISORDER: ICD-10-CM

## 2025-07-02 RX ORDER — TRAZODONE HYDROCHLORIDE 50 MG/1
50 TABLET ORAL NIGHTLY
Qty: 90 TABLET | Refills: 1 | Status: SHIPPED | OUTPATIENT
Start: 2025-07-02

## 2025-07-28 DIAGNOSIS — F41.9 ANXIETY: ICD-10-CM

## 2025-07-31 RX ORDER — CITALOPRAM HYDROBROMIDE 20 MG/1
20 TABLET ORAL DAILY
Qty: 90 TABLET | Refills: 1 | Status: SHIPPED | OUTPATIENT
Start: 2025-07-31